# Patient Record
Sex: FEMALE | Race: WHITE | NOT HISPANIC OR LATINO | Employment: UNEMPLOYED | URBAN - METROPOLITAN AREA
[De-identification: names, ages, dates, MRNs, and addresses within clinical notes are randomized per-mention and may not be internally consistent; named-entity substitution may affect disease eponyms.]

---

## 2018-10-24 ENCOUNTER — HOSPITAL ENCOUNTER (INPATIENT)
Facility: MEDICAL CENTER | Age: 58
LOS: 9 days | DRG: 736 | End: 2018-11-02
Attending: EMERGENCY MEDICINE | Admitting: INTERNAL MEDICINE
Payer: COMMERCIAL

## 2018-10-24 ENCOUNTER — HOSPITAL ENCOUNTER (OUTPATIENT)
Dept: RADIOLOGY | Facility: MEDICAL CENTER | Age: 58
End: 2018-10-24

## 2018-10-24 ENCOUNTER — APPOINTMENT (OUTPATIENT)
Dept: RADIOLOGY | Facility: MEDICAL CENTER | Age: 58
DRG: 736 | End: 2018-10-24
Attending: EMERGENCY MEDICINE
Payer: COMMERCIAL

## 2018-10-24 DIAGNOSIS — I82.432 ACUTE DEEP VEIN THROMBOSIS (DVT) OF POPLITEAL VEIN OF LEFT LOWER EXTREMITY (HCC): ICD-10-CM

## 2018-10-24 DIAGNOSIS — I82.401 ACUTE DEEP VEIN THROMBOSIS (DVT) OF RIGHT LOWER EXTREMITY, UNSPECIFIED VEIN (HCC): ICD-10-CM

## 2018-10-24 DIAGNOSIS — R19.00 PELVIC MASS: ICD-10-CM

## 2018-10-24 DIAGNOSIS — R19.00 PELVIC MASS IN FEMALE: ICD-10-CM

## 2018-10-24 LAB
ALBUMIN SERPL BCP-MCNC: 4.4 G/DL (ref 3.2–4.9)
ALBUMIN/GLOB SERPL: 1.2 G/DL
ALP SERPL-CCNC: 88 U/L (ref 30–99)
ALT SERPL-CCNC: 12 U/L (ref 2–50)
ANION GAP SERPL CALC-SCNC: 13 MMOL/L (ref 0–11.9)
APPEARANCE UR: CLEAR
APTT PPP: 70.7 SEC (ref 24.7–36)
AST SERPL-CCNC: 19 U/L (ref 12–45)
BASOPHILS # BLD AUTO: 0.6 % (ref 0–1.8)
BASOPHILS # BLD: 0.05 K/UL (ref 0–0.12)
BILIRUB SERPL-MCNC: 0.5 MG/DL (ref 0.1–1.5)
BILIRUB UR QL STRIP.AUTO: NEGATIVE
BUN SERPL-MCNC: 12 MG/DL (ref 8–22)
CALCIUM SERPL-MCNC: 10.3 MG/DL (ref 8.5–10.5)
CHLORIDE SERPL-SCNC: 98 MMOL/L (ref 96–112)
CO2 SERPL-SCNC: 26 MMOL/L (ref 20–33)
COLOR UR: ABNORMAL
CREAT SERPL-MCNC: 0.73 MG/DL (ref 0.5–1.4)
EOSINOPHIL # BLD AUTO: 0.28 K/UL (ref 0–0.51)
EOSINOPHIL NFR BLD: 3.2 % (ref 0–6.9)
ERYTHROCYTE [DISTWIDTH] IN BLOOD BY AUTOMATED COUNT: 41.4 FL (ref 35.9–50)
GLOBULIN SER CALC-MCNC: 3.8 G/DL (ref 1.9–3.5)
GLUCOSE SERPL-MCNC: 130 MG/DL (ref 65–99)
GLUCOSE UR STRIP.AUTO-MCNC: NEGATIVE MG/DL
HCT VFR BLD AUTO: 38.7 % (ref 37–47)
HGB BLD-MCNC: 13.7 G/DL (ref 12–16)
IMM GRANULOCYTES # BLD AUTO: 0.03 K/UL (ref 0–0.11)
IMM GRANULOCYTES NFR BLD AUTO: 0.3 % (ref 0–0.9)
INR PPP: 0.97 (ref 0.87–1.13)
KETONES UR STRIP.AUTO-MCNC: 15 MG/DL
LEUKOCYTE ESTERASE UR QL STRIP.AUTO: NEGATIVE
LIPASE SERPL-CCNC: 5 U/L (ref 11–82)
LYMPHOCYTES # BLD AUTO: 1.62 K/UL (ref 1–4.8)
LYMPHOCYTES NFR BLD: 18.7 % (ref 22–41)
MCH RBC QN AUTO: 33.3 PG (ref 27–33)
MCHC RBC AUTO-ENTMCNC: 35.4 G/DL (ref 33.6–35)
MCV RBC AUTO: 93.9 FL (ref 81.4–97.8)
MICRO URNS: ABNORMAL
MONOCYTES # BLD AUTO: 0.53 K/UL (ref 0–0.85)
MONOCYTES NFR BLD AUTO: 6.1 % (ref 0–13.4)
NEUTROPHILS # BLD AUTO: 6.15 K/UL (ref 2–7.15)
NEUTROPHILS NFR BLD: 71.1 % (ref 44–72)
NITRITE UR QL STRIP.AUTO: NEGATIVE
NRBC # BLD AUTO: 0 K/UL
NRBC BLD-RTO: 0 /100 WBC
PH UR STRIP.AUTO: >=9 [PH]
PLATELET # BLD AUTO: 215 K/UL (ref 164–446)
PMV BLD AUTO: 9 FL (ref 9–12.9)
POTASSIUM SERPL-SCNC: 4.1 MMOL/L (ref 3.6–5.5)
PROT SERPL-MCNC: 8.2 G/DL (ref 6–8.2)
PROT UR QL STRIP: NEGATIVE MG/DL
PROTHROMBIN TIME: 13 SEC (ref 12–14.6)
RBC # BLD AUTO: 4.12 M/UL (ref 4.2–5.4)
RBC UR QL AUTO: NEGATIVE
SODIUM SERPL-SCNC: 137 MMOL/L (ref 135–145)
SP GR UR STRIP.AUTO: 1.02
UROBILINOGEN UR STRIP.AUTO-MCNC: 0.2 MG/DL
WBC # BLD AUTO: 8.7 K/UL (ref 4.8–10.8)

## 2018-10-24 PROCEDURE — 85730 THROMBOPLASTIN TIME PARTIAL: CPT

## 2018-10-24 PROCEDURE — 81003 URINALYSIS AUTO W/O SCOPE: CPT

## 2018-10-24 PROCEDURE — 93971 EXTREMITY STUDY: CPT | Mod: LT

## 2018-10-24 PROCEDURE — 700111 HCHG RX REV CODE 636 W/ 250 OVERRIDE (IP): Performed by: EMERGENCY MEDICINE

## 2018-10-24 PROCEDURE — 80053 COMPREHEN METABOLIC PANEL: CPT

## 2018-10-24 PROCEDURE — 80048 BASIC METABOLIC PNL TOTAL CA: CPT

## 2018-10-24 PROCEDURE — 96376 TX/PRO/DX INJ SAME DRUG ADON: CPT

## 2018-10-24 PROCEDURE — 700102 HCHG RX REV CODE 250 W/ 637 OVERRIDE(OP): Performed by: INTERNAL MEDICINE

## 2018-10-24 PROCEDURE — 83690 ASSAY OF LIPASE: CPT

## 2018-10-24 PROCEDURE — 96375 TX/PRO/DX INJ NEW DRUG ADDON: CPT

## 2018-10-24 PROCEDURE — 700102 HCHG RX REV CODE 250 W/ 637 OVERRIDE(OP): Performed by: SPECIALIST

## 2018-10-24 PROCEDURE — A9270 NON-COVERED ITEM OR SERVICE: HCPCS | Performed by: INTERNAL MEDICINE

## 2018-10-24 PROCEDURE — 96366 THER/PROPH/DIAG IV INF ADDON: CPT

## 2018-10-24 PROCEDURE — 76830 TRANSVAGINAL US NON-OB: CPT

## 2018-10-24 PROCEDURE — 96365 THER/PROPH/DIAG IV INF INIT: CPT

## 2018-10-24 PROCEDURE — 770006 HCHG ROOM/CARE - MED/SURG/GYN SEMI*

## 2018-10-24 PROCEDURE — 99285 EMERGENCY DEPT VISIT HI MDM: CPT

## 2018-10-24 PROCEDURE — 36415 COLL VENOUS BLD VENIPUNCTURE: CPT

## 2018-10-24 PROCEDURE — 85610 PROTHROMBIN TIME: CPT

## 2018-10-24 PROCEDURE — 94760 N-INVAS EAR/PLS OXIMETRY 1: CPT

## 2018-10-24 PROCEDURE — 99223 1ST HOSP IP/OBS HIGH 75: CPT | Mod: AI | Performed by: INTERNAL MEDICINE

## 2018-10-24 PROCEDURE — 304561 HCHG STAT O2

## 2018-10-24 PROCEDURE — 85025 COMPLETE CBC W/AUTO DIFF WBC: CPT | Mod: 91

## 2018-10-24 PROCEDURE — A9270 NON-COVERED ITEM OR SERVICE: HCPCS | Performed by: SPECIALIST

## 2018-10-24 RX ORDER — HEPARIN SODIUM 1000 [USP'U]/ML
2200 INJECTION, SOLUTION INTRAVENOUS; SUBCUTANEOUS PRN
Status: DISCONTINUED | OUTPATIENT
Start: 2018-10-24 | End: 2018-10-24

## 2018-10-24 RX ORDER — ACETAMINOPHEN 650 MG/1
650 SUPPOSITORY RECTAL EVERY 6 HOURS
Status: DISCONTINUED | OUTPATIENT
Start: 2018-10-24 | End: 2018-10-25

## 2018-10-24 RX ORDER — OXYCODONE HYDROCHLORIDE 5 MG/1
5 TABLET ORAL
Status: DISCONTINUED | OUTPATIENT
Start: 2018-10-24 | End: 2018-10-30

## 2018-10-24 RX ORDER — ONDANSETRON 2 MG/ML
4 INJECTION INTRAMUSCULAR; INTRAVENOUS ONCE
Status: COMPLETED | OUTPATIENT
Start: 2018-10-24 | End: 2018-10-24

## 2018-10-24 RX ORDER — TRAMADOL HYDROCHLORIDE 50 MG/1
50 TABLET ORAL EVERY 4 HOURS PRN
Status: ON HOLD | COMMUNITY
End: 2018-11-02

## 2018-10-24 RX ORDER — PROMETHAZINE HYDROCHLORIDE 12.5 MG/1
12.5-25 SUPPOSITORY RECTAL EVERY 4 HOURS PRN
Status: DISCONTINUED | OUTPATIENT
Start: 2018-10-24 | End: 2018-11-02 | Stop reason: HOSPADM

## 2018-10-24 RX ORDER — OXYCODONE HYDROCHLORIDE 5 MG/1
2.5 TABLET ORAL
Status: DISCONTINUED | OUTPATIENT
Start: 2018-10-24 | End: 2018-10-30

## 2018-10-24 RX ORDER — LORAZEPAM 2 MG/ML
1 INJECTION INTRAMUSCULAR ONCE
Status: COMPLETED | OUTPATIENT
Start: 2018-10-24 | End: 2018-10-24

## 2018-10-24 RX ORDER — PROMETHAZINE HYDROCHLORIDE 25 MG/1
12.5-25 TABLET ORAL EVERY 4 HOURS PRN
Status: DISCONTINUED | OUTPATIENT
Start: 2018-10-24 | End: 2018-11-02 | Stop reason: HOSPADM

## 2018-10-24 RX ORDER — HEPARIN SODIUM 1000 [USP'U]/ML
2200 INJECTION, SOLUTION INTRAVENOUS; SUBCUTANEOUS PRN
Status: DISCONTINUED | OUTPATIENT
Start: 2018-10-24 | End: 2018-10-30

## 2018-10-24 RX ORDER — AMOXICILLIN 250 MG
2 CAPSULE ORAL 2 TIMES DAILY
Status: DISCONTINUED | OUTPATIENT
Start: 2018-10-24 | End: 2018-10-30

## 2018-10-24 RX ORDER — ACETAMINOPHEN 325 MG/1
650 TABLET ORAL EVERY 6 HOURS PRN
COMMUNITY

## 2018-10-24 RX ORDER — MORPHINE SULFATE 4 MG/ML
1-4 INJECTION, SOLUTION INTRAMUSCULAR; INTRAVENOUS
Status: DISCONTINUED | OUTPATIENT
Start: 2018-10-24 | End: 2018-10-25

## 2018-10-24 RX ORDER — HYDROMORPHONE HYDROCHLORIDE 2 MG/ML
.5-1 INJECTION, SOLUTION INTRAMUSCULAR; INTRAVENOUS; SUBCUTANEOUS
Status: COMPLETED | OUTPATIENT
Start: 2018-10-24 | End: 2018-10-24

## 2018-10-24 RX ORDER — POLYETHYLENE GLYCOL 3350 17 G/17G
1 POWDER, FOR SOLUTION ORAL
Status: DISCONTINUED | OUTPATIENT
Start: 2018-10-24 | End: 2018-10-29

## 2018-10-24 RX ORDER — LORATADINE 10 MG/1
10 TABLET ORAL DAILY
COMMUNITY

## 2018-10-24 RX ORDER — ONDANSETRON 2 MG/ML
4 INJECTION INTRAMUSCULAR; INTRAVENOUS EVERY 4 HOURS PRN
Status: DISCONTINUED | OUTPATIENT
Start: 2018-10-24 | End: 2018-10-30

## 2018-10-24 RX ORDER — ONDANSETRON 4 MG/1
4 TABLET, ORALLY DISINTEGRATING ORAL EVERY 4 HOURS PRN
Status: DISCONTINUED | OUTPATIENT
Start: 2018-10-24 | End: 2018-11-02 | Stop reason: HOSPADM

## 2018-10-24 RX ORDER — BISACODYL 10 MG
10 SUPPOSITORY, RECTAL RECTAL
Status: DISCONTINUED | OUTPATIENT
Start: 2018-10-24 | End: 2018-10-29

## 2018-10-24 RX ORDER — HEPARIN SODIUM 1000 [USP'U]/ML
4000 INJECTION, SOLUTION INTRAVENOUS; SUBCUTANEOUS ONCE
Status: COMPLETED | OUTPATIENT
Start: 2018-10-24 | End: 2018-10-24

## 2018-10-24 RX ADMIN — SENNOSIDES AND DOCUSATE SODIUM 2 TABLET: 8.6; 5 TABLET ORAL at 21:31

## 2018-10-24 RX ADMIN — HYDROMORPHONE HYDROCHLORIDE 1 MG: 2 INJECTION INTRAMUSCULAR; INTRAVENOUS; SUBCUTANEOUS at 13:58

## 2018-10-24 RX ADMIN — BENZOCAINE AND MENTHOL 2 LOZENGE: 15; 3.6 LOZENGE ORAL at 23:17

## 2018-10-24 RX ADMIN — LORAZEPAM 1 MG: 2 INJECTION INTRAMUSCULAR; INTRAVENOUS at 15:11

## 2018-10-24 RX ADMIN — HEPARIN SODIUM 4000 UNITS: 1000 INJECTION, SOLUTION INTRAVENOUS; SUBCUTANEOUS at 17:43

## 2018-10-24 RX ADMIN — ONDANSETRON 4 MG: 2 INJECTION INTRAMUSCULAR; INTRAVENOUS at 13:58

## 2018-10-24 RX ADMIN — OXYCODONE HYDROCHLORIDE 5 MG: 5 TABLET ORAL at 21:31

## 2018-10-24 RX ADMIN — HYDROMORPHONE HYDROCHLORIDE 1 MG: 2 INJECTION INTRAMUSCULAR; INTRAVENOUS; SUBCUTANEOUS at 15:11

## 2018-10-24 RX ADMIN — HYDROMORPHONE HYDROCHLORIDE 1 MG: 2 INJECTION INTRAMUSCULAR; INTRAVENOUS; SUBCUTANEOUS at 19:51

## 2018-10-24 RX ADMIN — HEPARIN SODIUM 900 UNITS/HR: 5000 INJECTION, SOLUTION INTRAVENOUS at 17:43

## 2018-10-24 ASSESSMENT — COGNITIVE AND FUNCTIONAL STATUS - GENERAL
DAILY ACTIVITIY SCORE: 24
SUGGESTED CMS G CODE MODIFIER DAILY ACTIVITY: CH
MOBILITY SCORE: 24
SUGGESTED CMS G CODE MODIFIER MOBILITY: CH

## 2018-10-24 ASSESSMENT — PATIENT HEALTH QUESTIONNAIRE - PHQ9
SUM OF ALL RESPONSES TO PHQ9 QUESTIONS 1 AND 2: 0
2. FEELING DOWN, DEPRESSED, IRRITABLE, OR HOPELESS: NOT AT ALL
1. LITTLE INTEREST OR PLEASURE IN DOING THINGS: NOT AT ALL

## 2018-10-24 ASSESSMENT — LIFESTYLE VARIABLES
DO YOU DRINK ALCOHOL: NO
EVER_SMOKED: NEVER

## 2018-10-24 ASSESSMENT — PAIN SCALES - GENERAL
PAINLEVEL_OUTOF10: 10
PAINLEVEL_OUTOF10: 3
PAINLEVEL_OUTOF10: 7

## 2018-10-24 ASSESSMENT — COPD QUESTIONNAIRES
DURING THE PAST 4 WEEKS HOW MUCH DID YOU FEEL SHORT OF BREATH: NONE/LITTLE OF THE TIME
HAVE YOU SMOKED AT LEAST 100 CIGARETTES IN YOUR ENTIRE LIFE: NO/DON'T KNOW
COPD SCREENING SCORE: 1
DO YOU EVER COUGH UP ANY MUCUS OR PHLEGM?: NO/ONLY WITH OCCASIONAL COLDS OR INFECTIONS

## 2018-10-24 NOTE — ED PROVIDER NOTES
ED Provider Note    Scribed for Gt Reed M.D. by Amadeo Chu. 10/24/2018  1:54 PM    Primary care provider: Tawanna Alves M.D.  Means of arrival: Walk-in  History obtained from: Patient  History limited by: None    CHIEF COMPLAINT  Chief Complaint   Patient presents with   • Abdominal Pain     LEFT SIDED ABD PAIN HST OF RECENT OVARIAN CYST INCREASED PAIN TODAY        HPI  Ani Parham is a 58 y.o. Female with a history of chronic back pain who presents to the Emergency Department complaining of left sided lower abdominal pain and left sided lower back pain. The abdominal pain began in 2018. Patient has had a normal pelvic exam since then. She has been taking 3 mg tramadol. This provided good contol until today. The pain radiates down her left leg. At 8:30 AM this morning she began to experience left calf pain. Her pain is cramping, but intermittently becomes sharp. The pain is constant. She denies vomiting, constipation, diarrhea, vaginal discharge, hematuria, vaginal discharge, or blood in stool. Her back pain also returned today after her L5-S1 epidural injection two weeks ago. He had an MRI after the epidural. This demonstrated a left 7.7  cm ovarian cyst. She is status .    REVIEW OF SYSTEMS  Pertinent positives include abdominal pain, back pain, leg pain, left calf pain. Pertinent negatives include no vomiting, constipation, diarrhea, vaginal discharge, hematuria, vaginal discharge, or blood in stool.  All other systems reviewed and negative.    PAST MEDICAL HISTORY   has a past medical history of Hypothyroidism.    SURGICAL HISTORY   has a past surgical history that includes primary c section and sinuscopy.    SOCIAL HISTORY  Social History   Substance Use Topics   • Smoking status: Never Smoker   • Smokeless tobacco: Never Used   • Alcohol use No      History   Drug Use No       FAMILY HISTORY  Family History   Problem Relation Age of Onset   • Hypertension Father    •  "Hyperlipidemia Father    • Blood Disease Father         pernicious anemia   • Psychiatry Mother         bipolar   • Stroke Maternal Grandfather    • Cancer Neg Hx    • Diabetes Neg Hx    • Heart Disease Neg Hx    • Thyroid Neg Hx        CURRENT MEDICATIONS  Home Medications     Reviewed by Tony Bernal (Pharmacy Tech) on 10/24/18 at 1711  Med List Status: Complete   Medication Last Dose Status   acetaminophen (TYLENOL) 325 MG Tab 10/24/2018 Active   cyanocobalamin (VITAMIN B-12) 1000 MCG/ML Solution 10/22/2018 Active   FIBER PO 10/24/2018 Active   levothyroxine (SYNTHROID) 25 MCG Tab 10/23/2018 Active   loratadine (CLARITIN) 10 MG Tab 10/24/2018 Active   multivitamin (THERAGRAN) Tab 10/24/2018 Active   tramadol (ULTRAM) 50 MG Tab 10/24/2018 Active                ALLERGIES  Allergies   Allergen Reactions   • Fluconazole Swelling     Face Swelling       PHYSICAL EXAM  VITAL SIGNS: /66   Pulse 77   Temp 36.8 °C (98.3 °F) (Temporal)   Resp 20   Ht 1.651 m (5' 5\")   Wt 59.9 kg (132 lb 0.9 oz)   SpO2 99%   BMI 21.98 kg/m²     Constitutional: Well developed, Well nourished, Moderate to severe distress, Non-toxic appearance.   HENT: Normocephalic, Atraumatic, Bilateral external ears normal, Dry mucous membranes, No oral exudates.   Eyes: PERRLA, EOMI, Conjunctiva normal, No discharge.   Neck: No tenderness, Supple, No stridor.   Lymphatic: No lymphadenopathy noted.   Cardiovascular: Normal heart rate, Normal rhythm.   Thorax & Lungs: Clear to auscultation bilaterally, No respiratory distress, No wheezing, No crackles.   Abdomen: Soft, Moderate to severe tenderness in the left superpubic area, No masses, No pulsatile masses.   Skin: Warm, Dry, No erythema, No rash.   Extremities:, Left calf is tender to palpation with trace pitting edema, 2+ pulses No cyanosis.   Musculoskeletal: No major deformities noted.  Intact distal pulses  Neurologic: Awake, alert. Moves all extremities spontaneously.  Psychiatric: " Affect normal, Judgment normal, Mood normal.       LABS  Labs Reviewed   CBC WITH DIFFERENTIAL - Abnormal; Notable for the following:        Result Value    RBC 4.12 (*)     MCH 33.3 (*)     MCHC 35.4 (*)     Lymphocytes 18.70 (*)     All other components within normal limits   COMP METABOLIC PANEL - Abnormal; Notable for the following:     Anion Gap 13.0 (*)     Glucose 130 (*)     Globulin 3.8 (*)     All other components within normal limits   LIPASE - Abnormal; Notable for the following:     Lipase 5 (*)     All other components within normal limits   URINALYSIS,CULTURE IF INDICATED   ESTIMATED GFR   APTT   PROTHROMBIN TIME     All labs reviewed by me.      RADIOLOGY  US-PELVIC TRANSVAGINAL ONLY   Final Result      1.  Large left adnexal mass is suspicious for neoplasm. Coexisting ovarian torsion cannot be excluded in the appropriate clinical setting. MRI of the pelvis using ovarian mass protocol may be helpful for further evaluation, if clinically indicated.      2.  Nonvisualization of the right ovary      3.  Small amount of free pelvic fluid.         Comment: Results discussed with Dr. Reed at 4:10 PM      OUTSIDE IMAGES-MR PELVIS   Final Result      US-EXTREMITY VENOUS LOWER UNILAT LEFT   Final Result        The radiologist's interpretation of all radiological studies have been reviewed by me.      COURSE & MEDICAL DECISION MAKING  Pertinent Labs & Imaging studies reviewed. (See chart for details)    I reviewed the patient's medical records which showed the patient's MRI demonstrated a 7.7 cm left ovarian cyst.    1:54 PM - Patient seen and examined at bedside. Patient will be treated with lorazepam injection 1 mg, ondansetron injection 1 mg, and hydromorphone injection 0.5-1 mg. Ordered US pelvic transvaginal, US extremity venous lower unilateral left, estimated GFR, CBC with differential, CMP, Lipase, and U/A culture if indicated to evaluate her symptoms. The differential diagnoses include but are not  limited to: ruptured ovarian cyst, DVT, chronic back pain    4:08 PM I discussed the patient's case and the above findings with Radiology who believes there is a ovarian mass in the pelvis. Paged Dr. Martinez, Gynecologic Oncology.    4:13 PM Recheck: Patient re-evaluated at beside. Patient reports feeling better. Patient's radiology results and treatment plan discussed. The patient understood and is in agreement.     Decision Making:  Patient coming in with worsening lower abdominal pain, apparently patient has been having both lower abdominal pain for extended period of time, had a MRI approximately week ago that showed a cystic structure of 7.7 cm in the pelvis.  The patient's pain slowly improved until this morning when the pain got acutely worse, the patient is writhing in pain, give the patient multiple doses of pain medicine and also with anxiety medicine.  Which improved the patient's symptoms.  Ultrasound shows a complex 11 x 11 x 7 cm heterogeneous mass in the pelvis, with small amount of free fluid, ultrasound of lower extremity shows positive for DVT.  Started the patient on heparin.  Discussed the case with Dr. Mata who will consult, start the patient on a heparin drip, discussed the case with the hospitalist for admission the hospital.        DISPOSITION:  Patient will be admitted to the hospitalist in guarded condition.    FINAL IMPRESSION  1. Pelvic mass in female    2. Acute deep vein thrombosis (DVT) of popliteal vein of left lower extremity (HCC)    3.  Critical care time of 35 minutes     Amadeo DUTTON (Aixa), am scribing for, and in the presence of, Gt Reed M.D..    Electronically signed by: Amadeo Chu (A[employer]a), 10/24/2018    Gt DUTTON M.D. personally performed the services described in this documentation, as scribed by Amadeo Chu in my presence, and it is both accurate and complete. C    The note accurately reflects work and decisions made by me.   Gt Reed  10/24/2018  5:56 PM

## 2018-10-24 NOTE — ED TRIAGE NOTES
PT TO TRIAGE .  Chief Complaint   Patient presents with   • Abdominal Pain     LEFT SIDED ABD PAIN HST OF RECENT OVARIAN CYST INCREASED PAIN TODAY    TEARFUL

## 2018-10-24 NOTE — ED NOTES
Pt reports pain x 5 days, prescribed tramadol by PCP with no relief, this morning pain got acutely worse. PIV established, connected to monitor

## 2018-10-25 ENCOUNTER — APPOINTMENT (OUTPATIENT)
Dept: RADIOLOGY | Facility: MEDICAL CENTER | Age: 58
DRG: 736 | End: 2018-10-25
Attending: INTERNAL MEDICINE
Payer: COMMERCIAL

## 2018-10-25 PROBLEM — I82.409 DVT (DEEP VENOUS THROMBOSIS) (HCC): Status: ACTIVE | Noted: 2018-10-25

## 2018-10-25 PROBLEM — J96.00 ACUTE RESPIRATORY FAILURE (HCC): Status: ACTIVE | Noted: 2018-10-25

## 2018-10-25 PROBLEM — R19.00 PELVIC MASS: Status: ACTIVE | Noted: 2018-10-25

## 2018-10-25 LAB
ANION GAP SERPL CALC-SCNC: 9 MMOL/L (ref 0–11.9)
APTT PPP: 48.4 SEC (ref 24.7–36)
APTT PPP: 53.6 SEC (ref 24.7–36)
APTT PPP: 74.1 SEC (ref 24.7–36)
APTT PPP: 83.5 SEC (ref 24.7–36)
BASOPHILS # BLD AUTO: 0.7 % (ref 0–1.8)
BASOPHILS # BLD: 0.06 K/UL (ref 0–0.12)
BUN SERPL-MCNC: 8 MG/DL (ref 8–22)
CALCIUM SERPL-MCNC: 9.6 MG/DL (ref 8.5–10.5)
CHLORIDE SERPL-SCNC: 99 MMOL/L (ref 96–112)
CO2 SERPL-SCNC: 26 MMOL/L (ref 20–33)
CREAT SERPL-MCNC: 0.61 MG/DL (ref 0.5–1.4)
EOSINOPHIL # BLD AUTO: 0.18 K/UL (ref 0–0.51)
EOSINOPHIL NFR BLD: 2.1 % (ref 0–6.9)
ERYTHROCYTE [DISTWIDTH] IN BLOOD BY AUTOMATED COUNT: 41.5 FL (ref 35.9–50)
GLUCOSE SERPL-MCNC: 142 MG/DL (ref 65–99)
HCG UR QL: NEGATIVE
HCT VFR BLD AUTO: 35 % (ref 37–47)
HGB BLD-MCNC: 11.9 G/DL (ref 12–16)
IMM GRANULOCYTES # BLD AUTO: 0.1 K/UL (ref 0–0.11)
IMM GRANULOCYTES NFR BLD AUTO: 1.2 % (ref 0–0.9)
LYMPHOCYTES # BLD AUTO: 2.28 K/UL (ref 1–4.8)
LYMPHOCYTES NFR BLD: 27.1 % (ref 22–41)
MCH RBC QN AUTO: 32.1 PG (ref 27–33)
MCHC RBC AUTO-ENTMCNC: 34 G/DL (ref 33.6–35)
MCV RBC AUTO: 94.3 FL (ref 81.4–97.8)
MONOCYTES # BLD AUTO: 0.68 K/UL (ref 0–0.85)
MONOCYTES NFR BLD AUTO: 8.1 % (ref 0–13.4)
NEUTROPHILS # BLD AUTO: 5.11 K/UL (ref 2–7.15)
NEUTROPHILS NFR BLD: 60.8 % (ref 44–72)
NRBC # BLD AUTO: 0 K/UL
NRBC BLD-RTO: 0 /100 WBC
PLATELET # BLD AUTO: 186 K/UL (ref 164–446)
PMV BLD AUTO: 9.1 FL (ref 9–12.9)
POTASSIUM SERPL-SCNC: 3.9 MMOL/L (ref 3.6–5.5)
RBC # BLD AUTO: 3.71 M/UL (ref 4.2–5.4)
SODIUM SERPL-SCNC: 134 MMOL/L (ref 135–145)
SP GR UR REFRACTOMETRY: 1.01
WBC # BLD AUTO: 8.4 K/UL (ref 4.8–10.8)

## 2018-10-25 PROCEDURE — 36415 COLL VENOUS BLD VENIPUNCTURE: CPT

## 2018-10-25 PROCEDURE — 71275 CT ANGIOGRAPHY CHEST: CPT

## 2018-10-25 PROCEDURE — 700111 HCHG RX REV CODE 636 W/ 250 OVERRIDE (IP): Performed by: FAMILY MEDICINE

## 2018-10-25 PROCEDURE — 85730 THROMBOPLASTIN TIME PARTIAL: CPT

## 2018-10-25 PROCEDURE — 81025 URINE PREGNANCY TEST: CPT

## 2018-10-25 PROCEDURE — 700111 HCHG RX REV CODE 636 W/ 250 OVERRIDE (IP): Performed by: SPECIALIST

## 2018-10-25 PROCEDURE — A9270 NON-COVERED ITEM OR SERVICE: HCPCS | Performed by: INTERNAL MEDICINE

## 2018-10-25 PROCEDURE — 700117 HCHG RX CONTRAST REV CODE 255: Performed by: INTERNAL MEDICINE

## 2018-10-25 PROCEDURE — 700102 HCHG RX REV CODE 250 W/ 637 OVERRIDE(OP): Performed by: INTERNAL MEDICINE

## 2018-10-25 PROCEDURE — 770006 HCHG ROOM/CARE - MED/SURG/GYN SEMI*

## 2018-10-25 PROCEDURE — 99232 SBSQ HOSP IP/OBS MODERATE 35: CPT | Performed by: FAMILY MEDICINE

## 2018-10-25 PROCEDURE — 700111 HCHG RX REV CODE 636 W/ 250 OVERRIDE (IP): Performed by: INTERNAL MEDICINE

## 2018-10-25 PROCEDURE — 71045 X-RAY EXAM CHEST 1 VIEW: CPT

## 2018-10-25 RX ORDER — HYDROMORPHONE HYDROCHLORIDE 2 MG/ML
1 INJECTION, SOLUTION INTRAMUSCULAR; INTRAVENOUS; SUBCUTANEOUS ONCE
Status: COMPLETED | OUTPATIENT
Start: 2018-10-25 | End: 2018-10-25

## 2018-10-25 RX ORDER — ACETAMINOPHEN 325 MG/1
650 TABLET ORAL EVERY 6 HOURS PRN
Status: DISCONTINUED | OUTPATIENT
Start: 2018-10-25 | End: 2018-10-30

## 2018-10-25 RX ORDER — HYDROMORPHONE HYDROCHLORIDE 2 MG/ML
0.5 INJECTION, SOLUTION INTRAMUSCULAR; INTRAVENOUS; SUBCUTANEOUS
Status: DISCONTINUED | OUTPATIENT
Start: 2018-10-25 | End: 2018-10-27

## 2018-10-25 RX ORDER — LORATADINE 10 MG/1
10 TABLET ORAL DAILY
Status: DISCONTINUED | OUTPATIENT
Start: 2018-10-25 | End: 2018-11-02 | Stop reason: HOSPADM

## 2018-10-25 RX ORDER — LEVOTHYROXINE SODIUM 0.03 MG/1
25 TABLET ORAL
Status: DISCONTINUED | OUTPATIENT
Start: 2018-10-25 | End: 2018-11-02 | Stop reason: HOSPADM

## 2018-10-25 RX ADMIN — OXYCODONE HYDROCHLORIDE 5 MG: 5 TABLET ORAL at 04:41

## 2018-10-25 RX ADMIN — HEPARIN SODIUM 2200 UNITS: 1000 INJECTION, SOLUTION INTRAVENOUS; SUBCUTANEOUS at 01:18

## 2018-10-25 RX ADMIN — LORATADINE 10 MG: 10 TABLET ORAL at 04:11

## 2018-10-25 RX ADMIN — HYDROMORPHONE HYDROCHLORIDE 0.5 MG: 2 INJECTION INTRAMUSCULAR; INTRAVENOUS; SUBCUTANEOUS at 09:03

## 2018-10-25 RX ADMIN — HYDROMORPHONE HYDROCHLORIDE 0.5 MG: 2 INJECTION INTRAMUSCULAR; INTRAVENOUS; SUBCUTANEOUS at 15:01

## 2018-10-25 RX ADMIN — OXYCODONE HYDROCHLORIDE 5 MG: 5 TABLET ORAL at 16:01

## 2018-10-25 RX ADMIN — HYDROMORPHONE HYDROCHLORIDE 1 MG: 2 INJECTION, SOLUTION INTRAMUSCULAR; INTRAVENOUS; SUBCUTANEOUS at 05:42

## 2018-10-25 RX ADMIN — OXYCODONE HYDROCHLORIDE 5 MG: 5 TABLET ORAL at 19:56

## 2018-10-25 RX ADMIN — OXYCODONE HYDROCHLORIDE 5 MG: 5 TABLET ORAL at 12:11

## 2018-10-25 RX ADMIN — ONDANSETRON 4 MG: 4 TABLET, ORALLY DISINTEGRATING ORAL at 10:27

## 2018-10-25 RX ADMIN — HEPARIN SODIUM 2200 UNITS: 1000 INJECTION, SOLUTION INTRAVENOUS; SUBCUTANEOUS at 16:07

## 2018-10-25 RX ADMIN — MORPHINE SULFATE 2 MG: 4 INJECTION INTRAVENOUS at 01:26

## 2018-10-25 RX ADMIN — HYDROMORPHONE HYDROCHLORIDE 0.5 MG: 2 INJECTION INTRAMUSCULAR; INTRAVENOUS; SUBCUTANEOUS at 18:15

## 2018-10-25 RX ADMIN — HYDROMORPHONE HYDROCHLORIDE 0.5 MG: 2 INJECTION INTRAMUSCULAR; INTRAVENOUS; SUBCUTANEOUS at 23:23

## 2018-10-25 RX ADMIN — HEPARIN SODIUM 1200 UNITS/HR: 5000 INJECTION, SOLUTION INTRAVENOUS at 18:23

## 2018-10-25 RX ADMIN — LEVOTHYROXINE SODIUM 25 MCG: 25 TABLET ORAL at 04:11

## 2018-10-25 RX ADMIN — HEPARIN SODIUM 1100 UNITS/HR: 5000 INJECTION, SOLUTION INTRAVENOUS at 01:19

## 2018-10-25 RX ADMIN — MORPHINE SULFATE 4 MG: 4 INJECTION INTRAVENOUS at 04:14

## 2018-10-25 RX ADMIN — IOHEXOL 45 ML: 350 INJECTION, SOLUTION INTRAVENOUS at 13:15

## 2018-10-25 RX ADMIN — OXYCODONE HYDROCHLORIDE 5 MG: 5 TABLET ORAL at 07:49

## 2018-10-25 RX ADMIN — ONDANSETRON 4 MG: 2 INJECTION INTRAMUSCULAR; INTRAVENOUS at 01:22

## 2018-10-25 RX ADMIN — HYDROMORPHONE HYDROCHLORIDE 0.5 MG: 2 INJECTION INTRAMUSCULAR; INTRAVENOUS; SUBCUTANEOUS at 21:01

## 2018-10-25 RX ADMIN — SENNOSIDES AND DOCUSATE SODIUM 2 TABLET: 8.6; 5 TABLET ORAL at 04:11

## 2018-10-25 ASSESSMENT — PAIN SCALES - GENERAL
PAINLEVEL_OUTOF10: 10
PAINLEVEL_OUTOF10: 10
PAINLEVEL_OUTOF10: 4
PAINLEVEL_OUTOF10: 8
PAINLEVEL_OUTOF10: 6
PAINLEVEL_OUTOF10: 6
PAINLEVEL_OUTOF10: 8
PAINLEVEL_OUTOF10: 8

## 2018-10-25 ASSESSMENT — ENCOUNTER SYMPTOMS
DIZZINESS: 0
BACK PAIN: 1
WEIGHT LOSS: 0
FEVER: 0
BRUISES/BLEEDS EASILY: 0
COUGH: 0
NAUSEA: 0
POLYDIPSIA: 0
BLOOD IN STOOL: 0
SPUTUM PRODUCTION: 0
NECK PAIN: 0
HEMOPTYSIS: 0
BLURRED VISION: 0
TREMORS: 0
MYALGIAS: 0
VOMITING: 0
CHILLS: 0
PHOTOPHOBIA: 0
BACK PAIN: 0
PALPITATIONS: 0
ABDOMINAL PAIN: 1
DEPRESSION: 0
ORTHOPNEA: 0
DOUBLE VISION: 0
WEIGHT LOSS: 1
HEARTBURN: 0
HEADACHES: 0
TINGLING: 0
CONSTIPATION: 1
DIARRHEA: 0

## 2018-10-25 ASSESSMENT — PATIENT HEALTH QUESTIONNAIRE - PHQ9
1. LITTLE INTEREST OR PLEASURE IN DOING THINGS: NOT AT ALL
SUM OF ALL RESPONSES TO PHQ9 QUESTIONS 1 AND 2: 0
2. FEELING DOWN, DEPRESSED, IRRITABLE, OR HOPELESS: NOT AT ALL

## 2018-10-25 ASSESSMENT — LIFESTYLE VARIABLES: SUBSTANCE_ABUSE: 0

## 2018-10-25 NOTE — ASSESSMENT & PLAN NOTE
- S/p ivc filter placement (10/27)  - Continue Lovenox BID  - Gyn following (10/31):  IVC filter placed and remove after chemotherapy and will treat with AC for 9 months.  - Continue Lovenox on discharge and start Warfarin

## 2018-10-25 NOTE — PROGRESS NOTES
Assumed care of patient @0700. Pt A&O x4, on 1L O2 via nc. C/o pain 7/10; will medicate. Last BM 10/24/18. Continent of bowel & bladder. Up with standby assist, steady gait. On regular diet, takes pills without difficulty. Patient on heparin drip. Fall precautions in place; bed in lowest position, treaded socks at bedside, call light within reach.  All needs met at this time.

## 2018-10-25 NOTE — PROGRESS NOTES
· 2 RN skin check complete with EMILY Camp  · Devices in place oxygen  · Skin assessed under devices, yes no issues noted  · Confirmed pressure ulcers found on: N/A.  · New potential pressure ulcers noted: N/A  All of patient skin is intact no issues noted.

## 2018-10-25 NOTE — ASSESSMENT & PLAN NOTE
- s/p  Laparoscopy...  Exploratory laparotomy with ERNST-BSO with resection of left parametrial tissue and left ureterolysis  - Now POD#4  - Ultrasound showed:1.  Large left adnexal mass is suspicious for neoplasm. Coexisting ovarian torsion cannot be excluded in the appropriate clinical setting. MRI of the pelvis using ovarian mass protocol may be helpful for further evaluation, if clinically indicated.  2.  Nonvisualization of the right ovary  3.  Small amount of free pelvic fluid.  - CT of the abd and pelvis showed:1. A 10.6 cm cystic and solid left adnexal mass, highly concerning for malignancy.  2. Small volume ascites.  3. Tiny hypodensity in the right hepatic lobe, too small to characterize, likely simple cyst.  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  - Gyn following (10/31):  Discussed path and recommend adjuvant chemotherapy Taxol/Carbo q 3 weeks X 6 not candidate [place] [facility] [place]. Discussed prognosis.

## 2018-10-25 NOTE — CARE PLAN
Problem: Pain Management  Goal: Pain level will decrease to patient's comfort goal  Outcome: PROGRESSING AS EXPECTED  Using PRN Roxicodone to help manage patients pain.     Problem: Safety  Goal: Will remain free from falls  Outcome: PROGRESSING AS EXPECTED  Bed locked in the lowest position with call light within reach.

## 2018-10-25 NOTE — PROGRESS NOTES
[facility] Hospitalist Progress Note    Date of Service: 10/25/2018    Chief Complaint  58 y.o. female admitted 10/24/2018 with dvt and pelvic mass      Interval Problem Update  none    Consultants/Specialty  surgery    Disposition  pending        Review of Systems   Constitutional: Positive for weight loss. Negative for chills and fever.   HENT: Negative for ear discharge, ear pain and tinnitus.    Eyes: Negative for blurred vision, double vision and photophobia.   Respiratory: Negative for cough, hemoptysis and sputum production.    Cardiovascular: Negative for chest pain, palpitations and orthopnea.   Gastrointestinal: Positive for abdominal pain. Negative for heartburn and nausea.   Genitourinary: Negative for dysuria, frequency and urgency.   Musculoskeletal: Negative for back pain, myalgias and neck pain.   Skin: Negative for itching and rash.   Neurological: Negative for dizziness, tingling and headaches.      Physical Exam  Laboratory/Imaging   Hemodynamics  Temp (24hrs), Av °C (98.6 °F), Min:36.7 °C (98.1 °F), Max:37.4 °C (99.4 °F)   Temperature: 37.4 °C (99.4 °F)  Pulse  Av.8  Min: 71  Max: 93    Blood Pressure: 104/70      Respiratory      Respiration: 16, Pulse Oximetry: 95 %, O2 Daily Delivery Respiratory : Silicone Nasal Cannula        RUL Breath Sounds: Clear, RML Breath Sounds: Diminished, RLL Breath Sounds: Diminished, TIFF Breath Sounds: Clear, LLL Breath Sounds: Diminished    Fluids    Intake/Output Summary (Last 24 hours) at 10/25/18 0852  Last data filed at 10/25/18 0400   Gross per 24 hour   Intake              300 ml   Output                0 ml   Net              300 ml       Nutrition  Orders Placed This Encounter   Procedures   • Diet Order Regular     Standing Status:   Standing     Number of Occurrences:   1     Order Specific Question:   Diet:     Answer:   Regular [1]     Physical Exam   Constitutional: She is oriented to person, place, and time. No distress.   HENT:   Head:  Normocephalic and atraumatic.   Eyes: Pupils are equal, round, and reactive to light. Conjunctivae are normal.   Neck: Normal range of motion. Neck supple.   Cardiovascular: Normal rate and regular rhythm.    Pulmonary/Chest: Effort normal and breath sounds normal.   Abdominal: There is tenderness. There is no rebound and no guarding.   Musculoskeletal: She exhibits no edema or tenderness.   Neurological: She is alert and oriented to person, place, and time.   Skin: Skin is warm and dry. She is not diaphoretic.       Recent Labs      10/24/18   1235  10/24/18   2340   WBC  8.7  8.4   RBC  4.12*  3.71*   HEMOGLOBIN  13.7  11.9*   HEMATOCRIT  38.7  35.0*   MCV  93.9  94.3   MCH  33.3*  32.1   MCHC  35.4*  34.0   RDW  41.4  41.5   PLATELETCT  215  186   MPV  9.0  9.1     Recent Labs      10/24/18   1235  10/24/18   2340   SODIUM  137  134*   POTASSIUM  4.1  3.9   CHLORIDE  98  99   CO2  26  26   GLUCOSE  130*  142*   BUN  12  8   CREATININE  0.73  0.61   CALCIUM  10.3  9.6     Recent Labs      10/24/18   1235  10/24/18   2057  10/24/18   2340  10/25/18   0744   APTT   --   70.7*  53.6*  74.1*   INR  0.97   --    --    --                   Assessment/Plan     Pelvic mass   Assessment & Plan     Ultrasound showed:1.  Large left adnexal mass is suspicious for neoplasm. Coexisting ovarian torsion cannot be excluded in the appropriate clinical setting. MRI of the pelvis using ovarian mass protocol may be helpful for further evaluation, if clinically indicated.    2.  Nonvisualization of the right ovary    3.  Small amount of free pelvic fluid.  Dr. Martinez is  consulted  Pain is not well controlled  Will dc morphine  Started dilaudid  Bowel regimen        Acute respiratory failure (HCC)   Assessment & Plan    Has resolved  cxray is normal        DVT (deep venous thrombosis) (HCC)   Assessment & Plan    On heparin drip        Hypothyroidism- (present on admission)   Assessment & Plan    Continue home dose of  levothyroxine.  Follow-up with PCP          Quality-Core Measures   Gabriel catheter::  No Gabriel  DVT prophylaxis pharmacological::  Heparin

## 2018-10-25 NOTE — ASSESSMENT & PLAN NOTE
Has resolved  Pulmonary input is noted  cta showed:1.  Acute bilateral pulmonary emboli.  2.  Findings there is a possibility of RIGHT ventricular strain.  3.  Bilateral dependent atelectasis.  Cardiac echo  Showed:No prior study is available for comparison.   Left ventricular ejection fraction is visually estimated to be 60%.  No valve or doppler abnormality.  No sign of cardiac strain

## 2018-10-26 ENCOUNTER — APPOINTMENT (OUTPATIENT)
Dept: CARDIOLOGY | Facility: MEDICAL CENTER | Age: 58
DRG: 736 | End: 2018-10-26
Attending: FAMILY MEDICINE
Payer: COMMERCIAL

## 2018-10-26 ENCOUNTER — APPOINTMENT (OUTPATIENT)
Dept: RADIOLOGY | Facility: MEDICAL CENTER | Age: 58
DRG: 736 | End: 2018-10-26
Attending: SPECIALIST
Payer: COMMERCIAL

## 2018-10-26 LAB
ALBUMIN SERPL BCP-MCNC: 3.6 G/DL (ref 3.2–4.9)
ALBUMIN/GLOB SERPL: 1.1 G/DL
ALP SERPL-CCNC: 74 U/L (ref 30–99)
ALT SERPL-CCNC: 10 U/L (ref 2–50)
ANION GAP SERPL CALC-SCNC: 8 MMOL/L (ref 0–11.9)
APTT PPP: 53.8 SEC (ref 24.7–36)
APTT PPP: 76.2 SEC (ref 24.7–36)
APTT PPP: 85.5 SEC (ref 24.7–36)
AST SERPL-CCNC: 16 U/L (ref 12–45)
BASOPHILS # BLD AUTO: 1 % (ref 0–1.8)
BASOPHILS # BLD: 0.08 K/UL (ref 0–0.12)
BILIRUB SERPL-MCNC: 0.6 MG/DL (ref 0.1–1.5)
BUN SERPL-MCNC: 6 MG/DL (ref 8–22)
CALCIUM SERPL-MCNC: 9.6 MG/DL (ref 8.5–10.5)
CANCER AG125 SERPL-ACNC: 416.9 U/ML (ref 0–35)
CHLORIDE SERPL-SCNC: 98 MMOL/L (ref 96–112)
CO2 SERPL-SCNC: 28 MMOL/L (ref 20–33)
CREAT SERPL-MCNC: 0.7 MG/DL (ref 0.5–1.4)
EOSINOPHIL # BLD AUTO: 0.5 K/UL (ref 0–0.51)
EOSINOPHIL NFR BLD: 6.1 % (ref 0–6.9)
ERYTHROCYTE [DISTWIDTH] IN BLOOD BY AUTOMATED COUNT: 41.8 FL (ref 35.9–50)
GLOBULIN SER CALC-MCNC: 3.4 G/DL (ref 1.9–3.5)
GLUCOSE SERPL-MCNC: 121 MG/DL (ref 65–99)
HCT VFR BLD AUTO: 35.5 % (ref 37–47)
HGB BLD-MCNC: 11.7 G/DL (ref 12–16)
IMM GRANULOCYTES # BLD AUTO: 0.03 K/UL (ref 0–0.11)
IMM GRANULOCYTES NFR BLD AUTO: 0.4 % (ref 0–0.9)
LV EJECT FRACT  99904: 60
LV EJECT FRACT MOD 2C 99903: 75.6
LV EJECT FRACT MOD 4C 99902: 73.92
LV EJECT FRACT MOD BP 99901: 75.15
LYMPHOCYTES # BLD AUTO: 1.86 K/UL (ref 1–4.8)
LYMPHOCYTES NFR BLD: 22.6 % (ref 22–41)
MCH RBC QN AUTO: 31.5 PG (ref 27–33)
MCHC RBC AUTO-ENTMCNC: 33 G/DL (ref 33.6–35)
MCV RBC AUTO: 95.7 FL (ref 81.4–97.8)
MONOCYTES # BLD AUTO: 0.69 K/UL (ref 0–0.85)
MONOCYTES NFR BLD AUTO: 8.4 % (ref 0–13.4)
NEUTROPHILS # BLD AUTO: 5.06 K/UL (ref 2–7.15)
NEUTROPHILS NFR BLD: 61.5 % (ref 44–72)
NRBC # BLD AUTO: 0 K/UL
NRBC BLD-RTO: 0 /100 WBC
PLATELET # BLD AUTO: 191 K/UL (ref 164–446)
PMV BLD AUTO: 9.1 FL (ref 9–12.9)
POTASSIUM SERPL-SCNC: 4.3 MMOL/L (ref 3.6–5.5)
PROT SERPL-MCNC: 7 G/DL (ref 6–8.2)
RBC # BLD AUTO: 3.71 M/UL (ref 4.2–5.4)
SODIUM SERPL-SCNC: 134 MMOL/L (ref 135–145)
WBC # BLD AUTO: 8.2 K/UL (ref 4.8–10.8)

## 2018-10-26 PROCEDURE — 770006 HCHG ROOM/CARE - MED/SURG/GYN SEMI*

## 2018-10-26 PROCEDURE — 86304 IMMUNOASSAY TUMOR CA 125: CPT

## 2018-10-26 PROCEDURE — 74177 CT ABD & PELVIS W/CONTRAST: CPT

## 2018-10-26 PROCEDURE — 700102 HCHG RX REV CODE 250 W/ 637 OVERRIDE(OP): Performed by: INTERNAL MEDICINE

## 2018-10-26 PROCEDURE — 700111 HCHG RX REV CODE 636 W/ 250 OVERRIDE (IP): Performed by: FAMILY MEDICINE

## 2018-10-26 PROCEDURE — 700117 HCHG RX CONTRAST REV CODE 255: Performed by: SPECIALIST

## 2018-10-26 PROCEDURE — 93306 TTE W/DOPPLER COMPLETE: CPT

## 2018-10-26 PROCEDURE — 80053 COMPREHEN METABOLIC PANEL: CPT

## 2018-10-26 PROCEDURE — 93308 TTE F-UP OR LMTD: CPT | Mod: 26 | Performed by: INTERNAL MEDICINE

## 2018-10-26 PROCEDURE — 85730 THROMBOPLASTIN TIME PARTIAL: CPT | Mod: 91

## 2018-10-26 PROCEDURE — 700111 HCHG RX REV CODE 636 W/ 250 OVERRIDE (IP): Performed by: INTERNAL MEDICINE

## 2018-10-26 PROCEDURE — 85025 COMPLETE CBC W/AUTO DIFF WBC: CPT

## 2018-10-26 PROCEDURE — 36415 COLL VENOUS BLD VENIPUNCTURE: CPT

## 2018-10-26 PROCEDURE — A9270 NON-COVERED ITEM OR SERVICE: HCPCS | Performed by: INTERNAL MEDICINE

## 2018-10-26 PROCEDURE — 99232 SBSQ HOSP IP/OBS MODERATE 35: CPT | Performed by: FAMILY MEDICINE

## 2018-10-26 RX ADMIN — POLYETHYLENE GLYCOL 3350 1 PACKET: 17 POWDER, FOR SOLUTION ORAL at 09:31

## 2018-10-26 RX ADMIN — HYDROMORPHONE HYDROCHLORIDE 0.5 MG: 2 INJECTION INTRAMUSCULAR; INTRAVENOUS; SUBCUTANEOUS at 18:47

## 2018-10-26 RX ADMIN — IOHEXOL 100 ML: 350 INJECTION, SOLUTION INTRAVENOUS at 18:06

## 2018-10-26 RX ADMIN — HYDROMORPHONE HYDROCHLORIDE 0.5 MG: 2 INJECTION INTRAMUSCULAR; INTRAVENOUS; SUBCUTANEOUS at 10:37

## 2018-10-26 RX ADMIN — ONDANSETRON 4 MG: 2 INJECTION INTRAMUSCULAR; INTRAVENOUS at 09:37

## 2018-10-26 RX ADMIN — HYDROMORPHONE HYDROCHLORIDE 0.5 MG: 2 INJECTION INTRAMUSCULAR; INTRAVENOUS; SUBCUTANEOUS at 05:00

## 2018-10-26 RX ADMIN — OXYCODONE HYDROCHLORIDE 5 MG: 5 TABLET ORAL at 02:32

## 2018-10-26 RX ADMIN — LEVOTHYROXINE SODIUM 25 MCG: 25 TABLET ORAL at 05:00

## 2018-10-26 RX ADMIN — MAGNESIUM HYDROXIDE 30 ML: 400 SUSPENSION ORAL at 11:47

## 2018-10-26 RX ADMIN — OXYCODONE HYDROCHLORIDE 5 MG: 5 TABLET ORAL at 08:38

## 2018-10-26 RX ADMIN — HYDROMORPHONE HYDROCHLORIDE 0.5 MG: 2 INJECTION INTRAMUSCULAR; INTRAVENOUS; SUBCUTANEOUS at 23:25

## 2018-10-26 RX ADMIN — SENNOSIDES AND DOCUSATE SODIUM 2 TABLET: 8.6; 5 TABLET ORAL at 05:01

## 2018-10-26 RX ADMIN — LORATADINE 10 MG: 10 TABLET ORAL at 05:00

## 2018-10-26 RX ADMIN — OXYCODONE HYDROCHLORIDE 5 MG: 5 TABLET ORAL at 13:48

## 2018-10-26 RX ADMIN — HYDROMORPHONE HYDROCHLORIDE 0.5 MG: 2 INJECTION INTRAMUSCULAR; INTRAVENOUS; SUBCUTANEOUS at 08:13

## 2018-10-26 RX ADMIN — OXYCODONE HYDROCHLORIDE 5 MG: 5 TABLET ORAL at 20:41

## 2018-10-26 RX ADMIN — HEPARIN SODIUM 2200 UNITS: 1000 INJECTION, SOLUTION INTRAVENOUS; SUBCUTANEOUS at 13:59

## 2018-10-26 RX ADMIN — HEPARIN SODIUM 1300 UNITS/HR: 5000 INJECTION, SOLUTION INTRAVENOUS at 17:03

## 2018-10-26 RX ADMIN — HYDROMORPHONE HYDROCHLORIDE 0.5 MG: 2 INJECTION INTRAMUSCULAR; INTRAVENOUS; SUBCUTANEOUS at 14:33

## 2018-10-26 ASSESSMENT — ENCOUNTER SYMPTOMS
DIAPHORESIS: 0
EYE DISCHARGE: 0
TINGLING: 0
ABDOMINAL PAIN: 1
PHOTOPHOBIA: 0
NAUSEA: 0
FLANK PAIN: 0
CHILLS: 0
FALLS: 0
ORTHOPNEA: 0
EYE PAIN: 0
BACK PAIN: 0
TREMORS: 0
HEMOPTYSIS: 0
SHORTNESS OF BREATH: 0
VOMITING: 0
SPUTUM PRODUCTION: 0
CLAUDICATION: 0
WEIGHT LOSS: 1
SENSORY CHANGE: 0

## 2018-10-26 ASSESSMENT — PAIN SCALES - GENERAL
PAINLEVEL_OUTOF10: 6
PAINLEVEL_OUTOF10: 5
PAINLEVEL_OUTOF10: 8
PAINLEVEL_OUTOF10: 3
PAINLEVEL_OUTOF10: 5
PAINLEVEL_OUTOF10: 7
PAINLEVEL_OUTOF10: 5
PAINLEVEL_OUTOF10: 5
PAINLEVEL_OUTOF10: 9
PAINLEVEL_OUTOF10: 4

## 2018-10-26 NOTE — PROGRESS NOTES
[facility]    Date of Service: 10/26/2018    Chief Complaint  58 y.o. female admitted 10/24/2018 with dvt and pelvic mass      Interval Problem Update  IVC FILTER PLACEMENT    Consultants/Specialty  Surgery  I.R    Disposition  pending        Review of Systems   Constitutional: Positive for weight loss. Negative for chills and diaphoresis.   HENT: Negative for congestion, ear discharge and nosebleeds.    Eyes: Negative for photophobia, pain and discharge.   Respiratory: Negative for hemoptysis, sputum production and shortness of breath.    Cardiovascular: Negative for orthopnea, claudication and leg swelling.   Gastrointestinal: Positive for abdominal pain. Negative for nausea and vomiting.   Genitourinary: Negative for flank pain, frequency and hematuria.   Musculoskeletal: Negative for back pain, falls and joint pain.   Skin: Negative for itching and rash.   Neurological: Negative for tingling, tremors and sensory change.      Physical Exam  Laboratory/Imaging   Hemodynamics  Temp (24hrs), Av.9 °C (98.5 °F), Min:36.4 °C (97.6 °F), Max:37.3 °C (99.1 °F)   Temperature: 36.8 °C (98.2 °F)  Pulse  Av.1  Min: 71  Max: 93    Blood Pressure: 121/66      Respiratory      Respiration: 18, Pulse Oximetry: 99 %        RUL Breath Sounds: Clear, RML Breath Sounds: Diminished, RLL Breath Sounds: Diminished, TIFF Breath Sounds: Clear, LLL Breath Sounds: Diminished    Fluids    Intake/Output Summary (Last 24 hours) at 10/26/18 0917  Last data filed at 10/25/18 1400   Gross per 24 hour   Intake              436 ml   Output                0 ml   Net              436 ml       Nutrition  Orders Placed This Encounter   Procedures   • Diet Order Regular     Standing Status:   Standing     Number of Occurrences:   1     Order Specific Question:   Diet:     Answer:   Regular [1]     Physical Exam   Constitutional: She is oriented to person, place, and time. She appears well-developed and well-nourished.    HENT:   Right Ear: External ear normal.   Left Ear: External ear normal.   Eyes: Right eye exhibits no discharge. Left eye exhibits no discharge.   Neck: No JVD present.   Cardiovascular: Normal heart sounds.    Pulmonary/Chest: No stridor. No respiratory distress. She has no wheezes. She has no rales.   Abdominal: There is tenderness. There is guarding. There is no rebound.   Musculoskeletal: She exhibits no edema or tenderness.   Neurological: She is alert and oriented to person, place, and time.   Skin: Skin is warm and dry.       Recent Labs      10/24/18   1235  10/24/18   2340  10/26/18   0357   WBC  8.7  8.4  8.2   RBC  4.12*  3.71*  3.71*   HEMOGLOBIN  13.7  11.9*  11.7*   HEMATOCRIT  38.7  35.0*  35.5*   MCV  93.9  94.3  95.7   MCH  33.3*  32.1  31.5   MCHC  35.4*  34.0  33.0*   RDW  41.4  41.5  41.8   PLATELETCT  215  186  191   MPV  9.0  9.1  9.1     Recent Labs      10/24/18   1235  10/24/18   2340  10/26/18   0357   SODIUM  137  134*  134*   POTASSIUM  4.1  3.9  4.3   CHLORIDE  98  99  98   CO2  26  26  28   GLUCOSE  130*  142*  121*   BUN  12  8  6*   CREATININE  0.73  0.61  0.70   CALCIUM  10.3  9.6  9.6     Recent Labs      10/24/18   1235   10/25/18   1424  10/25/18   2158  10/26/18   0357   APTT   --    < >  48.4*  83.5*  85.5*   INR  0.97   --    --    --    --     < > = values in this interval not displayed.                  Assessment/Plan     Pelvic mass   Assessment & Plan     Ultrasound showed:1.  Large left adnexal mass is suspicious for neoplasm. Coexisting ovarian torsion cannot be excluded in the appropriate clinical setting. MRI of the pelvis using ovarian mass protocol may be helpful for further evaluation, if clinically indicated.    2.  Nonvisualization of the right ovary    3.  Small amount of free pelvic fluid.  Ct of the abd is pending  Surgical input is noted  She is at moderate risk for operation  Bowel regimen  D/w the pt and her  extensively yesterday and answered all  of her questions today  With the nurse in the room        Acute respiratory failure (HCC)   Assessment & Plan    Has resolved  cta showed:1.  Acute bilateral pulmonary emboli.  2.  Findings there is a possibility of RIGHT ventricular strain.  3.  Bilateral dependent atelectasis.  PT is not showing hypotension and is in no resp distress  She is on 1 liter of O2 and her O2 sat is 99%  Cardiac echo is pending        DVT (deep venous thrombosis) ([facility])   Assessment & Plan    On heparin drip        Hypothyroidism- (present on admission)   Assessment & Plan    Continue home dose of levothyroxine.  Follow-up with PCP          Quality-Core Measures   Gabriel catheter::  No Gabriel  DVT prophylaxis pharmacological::  Heparin

## 2018-10-26 NOTE — CARE PLAN
Problem: Pain Management  Goal: Pain level will decrease to patient's comfort goal  Outcome: PROGRESSING AS EXPECTED  Using PRN oxycodone and dilaudid to help manage pain.      Problem: Safety  Goal: Will remain free from injury  Outcome: PROGRESSING AS EXPECTED

## 2018-10-26 NOTE — PROGRESS NOTES
Chart reviewed and images reviewed.   Will followup with full consult.   Will need surgery however with acute PE will need IVC filter and medical clearance.   I will order  and also CT scan to evaluate the mass.

## 2018-10-27 ENCOUNTER — APPOINTMENT (OUTPATIENT)
Dept: RADIOLOGY | Facility: MEDICAL CENTER | Age: 58
DRG: 736 | End: 2018-10-27
Attending: FAMILY MEDICINE
Payer: COMMERCIAL

## 2018-10-27 LAB
APTT PPP: 69.3 SEC (ref 24.7–36)
APTT PPP: 69.9 SEC (ref 24.7–36)
INR PPP: 1.02 (ref 0.87–1.13)
PROTHROMBIN TIME: 13.5 SEC (ref 12–14.6)

## 2018-10-27 PROCEDURE — A9270 NON-COVERED ITEM OR SERVICE: HCPCS | Performed by: INTERNAL MEDICINE

## 2018-10-27 PROCEDURE — 99153 MOD SED SAME PHYS/QHP EA: CPT

## 2018-10-27 PROCEDURE — 770006 HCHG ROOM/CARE - MED/SURG/GYN SEMI*

## 2018-10-27 PROCEDURE — 85730 THROMBOPLASTIN TIME PARTIAL: CPT

## 2018-10-27 PROCEDURE — 700111 HCHG RX REV CODE 636 W/ 250 OVERRIDE (IP): Performed by: INTERNAL MEDICINE

## 2018-10-27 PROCEDURE — 700111 HCHG RX REV CODE 636 W/ 250 OVERRIDE (IP): Performed by: FAMILY MEDICINE

## 2018-10-27 PROCEDURE — 36415 COLL VENOUS BLD VENIPUNCTURE: CPT

## 2018-10-27 PROCEDURE — 06H03DZ INSERTION OF INTRALUMINAL DEVICE INTO INFERIOR VENA CAVA, PERCUTANEOUS APPROACH: ICD-10-PCS | Performed by: RADIOLOGY

## 2018-10-27 PROCEDURE — 700117 HCHG RX CONTRAST REV CODE 255: Performed by: RADIOLOGY

## 2018-10-27 PROCEDURE — 700102 HCHG RX REV CODE 250 W/ 637 OVERRIDE(OP): Performed by: INTERNAL MEDICINE

## 2018-10-27 PROCEDURE — 700111 HCHG RX REV CODE 636 W/ 250 OVERRIDE (IP)

## 2018-10-27 PROCEDURE — 99232 SBSQ HOSP IP/OBS MODERATE 35: CPT | Performed by: FAMILY MEDICINE

## 2018-10-27 PROCEDURE — 85610 PROTHROMBIN TIME: CPT

## 2018-10-27 RX ORDER — HYDROMORPHONE HYDROCHLORIDE 2 MG/ML
0.5 INJECTION, SOLUTION INTRAMUSCULAR; INTRAVENOUS; SUBCUTANEOUS
Status: DISCONTINUED | OUTPATIENT
Start: 2018-10-27 | End: 2018-10-30

## 2018-10-27 RX ORDER — ONDANSETRON 2 MG/ML
4 INJECTION INTRAMUSCULAR; INTRAVENOUS PRN
Status: ACTIVE | OUTPATIENT
Start: 2018-10-27 | End: 2018-10-27

## 2018-10-27 RX ORDER — SODIUM CHLORIDE 9 MG/ML
500 INJECTION, SOLUTION INTRAVENOUS
Status: ACTIVE | OUTPATIENT
Start: 2018-10-27 | End: 2018-10-27

## 2018-10-27 RX ORDER — MIDAZOLAM HYDROCHLORIDE 1 MG/ML
INJECTION INTRAMUSCULAR; INTRAVENOUS
Status: COMPLETED
Start: 2018-10-27 | End: 2018-10-27

## 2018-10-27 RX ORDER — MIDAZOLAM HYDROCHLORIDE 1 MG/ML
.5-2 INJECTION INTRAMUSCULAR; INTRAVENOUS PRN
Status: ACTIVE | OUTPATIENT
Start: 2018-10-27 | End: 2018-10-27

## 2018-10-27 RX ADMIN — HYDROMORPHONE HYDROCHLORIDE 0.5 MG: 2 INJECTION INTRAMUSCULAR; INTRAVENOUS; SUBCUTANEOUS at 11:10

## 2018-10-27 RX ADMIN — FENTANYL CITRATE 50 MCG: 50 INJECTION, SOLUTION INTRAMUSCULAR; INTRAVENOUS at 10:19

## 2018-10-27 RX ADMIN — OXYCODONE HYDROCHLORIDE 5 MG: 5 TABLET ORAL at 23:03

## 2018-10-27 RX ADMIN — MIDAZOLAM HYDROCHLORIDE 2 MG: 1 INJECTION INTRAMUSCULAR; INTRAVENOUS at 10:19

## 2018-10-27 RX ADMIN — HYDROMORPHONE HYDROCHLORIDE 0.5 MG: 2 INJECTION INTRAMUSCULAR; INTRAVENOUS; SUBCUTANEOUS at 18:33

## 2018-10-27 RX ADMIN — LORATADINE 10 MG: 10 TABLET ORAL at 08:14

## 2018-10-27 RX ADMIN — IOHEXOL 46 ML: 300 INJECTION, SOLUTION INTRAVENOUS at 10:40

## 2018-10-27 RX ADMIN — HYDROMORPHONE HYDROCHLORIDE 0.5 MG: 2 INJECTION INTRAMUSCULAR; INTRAVENOUS; SUBCUTANEOUS at 21:56

## 2018-10-27 RX ADMIN — HYDROMORPHONE HYDROCHLORIDE 0.5 MG: 2 INJECTION INTRAMUSCULAR; INTRAVENOUS; SUBCUTANEOUS at 08:10

## 2018-10-27 RX ADMIN — LEVOTHYROXINE SODIUM 25 MCG: 25 TABLET ORAL at 08:14

## 2018-10-27 RX ADMIN — OXYCODONE HYDROCHLORIDE 5 MG: 5 TABLET ORAL at 20:02

## 2018-10-27 RX ADMIN — OXYCODONE HYDROCHLORIDE 5 MG: 5 TABLET ORAL at 12:52

## 2018-10-27 RX ADMIN — HYDROMORPHONE HYDROCHLORIDE 0.5 MG: 2 INJECTION INTRAMUSCULAR; INTRAVENOUS; SUBCUTANEOUS at 14:27

## 2018-10-27 RX ADMIN — HYDROMORPHONE HYDROCHLORIDE 0.5 MG: 2 INJECTION INTRAMUSCULAR; INTRAVENOUS; SUBCUTANEOUS at 06:01

## 2018-10-27 RX ADMIN — SENNOSIDES AND DOCUSATE SODIUM 2 TABLET: 8.6; 5 TABLET ORAL at 18:28

## 2018-10-27 RX ADMIN — MIDAZOLAM 2 MG: 1 INJECTION INTRAMUSCULAR; INTRAVENOUS at 10:19

## 2018-10-27 RX ADMIN — HEPARIN SODIUM 1300 UNITS/HR: 5000 INJECTION, SOLUTION INTRAVENOUS at 14:33

## 2018-10-27 RX ADMIN — HYDROMORPHONE HYDROCHLORIDE 0.5 MG: 2 INJECTION INTRAMUSCULAR; INTRAVENOUS; SUBCUTANEOUS at 02:04

## 2018-10-27 RX ADMIN — OXYCODONE HYDROCHLORIDE 5 MG: 5 TABLET ORAL at 08:14

## 2018-10-27 RX ADMIN — HYDROMORPHONE HYDROCHLORIDE 0.5 MG: 2 INJECTION INTRAMUSCULAR; INTRAVENOUS; SUBCUTANEOUS at 16:15

## 2018-10-27 RX ADMIN — OXYCODONE HYDROCHLORIDE 5 MG: 5 TABLET ORAL at 02:30

## 2018-10-27 ASSESSMENT — ENCOUNTER SYMPTOMS
WEIGHT LOSS: 1
COUGH: 0
PHOTOPHOBIA: 0
DOUBLE VISION: 0
CHILLS: 0
ORTHOPNEA: 0
BLURRED VISION: 0
TREMORS: 0
HEMOPTYSIS: 0
HEADACHES: 0
VOMITING: 0
PALPITATIONS: 0
DIARRHEA: 0
BACK PAIN: 0
ABDOMINAL PAIN: 1
FEVER: 0
SPUTUM PRODUCTION: 0
FALLS: 0
TINGLING: 0

## 2018-10-27 ASSESSMENT — PAIN SCALES - GENERAL
PAINLEVEL_OUTOF10: 6
PAINLEVEL_OUTOF10: 5
PAINLEVEL_OUTOF10: 0
PAINLEVEL_OUTOF10: 7
PAINLEVEL_OUTOF10: 10
PAINLEVEL_OUTOF10: 6
PAINLEVEL_OUTOF10: 7
PAINLEVEL_OUTOF10: 5
PAINLEVEL_OUTOF10: 5
PAINLEVEL_OUTOF10: 9
PAINLEVEL_OUTOF10: 6
PAINLEVEL_OUTOF10: 7
PAINLEVEL_OUTOF10: 5
PAINLEVEL_OUTOF10: 8

## 2018-10-27 NOTE — PROGRESS NOTES
IR Procedure Note:    Site Marked and Confirmed with MD, patient and RN pre procedure. Dr. Maradiaga placed an IVC Filter.  The pt tolerated the procedure well; ETCo2 baseline 36, with consistent waveform during the procedure.  Gauze and tegaderm applied to right neck, CDI and soft; pressure held x 5 minutes.  Pt alert and verbally appropriate post procedure, vital signs stable during procedure and transport, see flow sheet for vital signs.  Report given to Tracey DIAZ.  RN transported pt to [facility].      COOK Celect Platinum IVC Filter. REF: [id number]. REF: [id number]. LOT: [id number]    Procedure End Time: 1038

## 2018-10-27 NOTE — PROGRESS NOTES
Received a phone call from IR that pt suppose to have Protime this am.  RN told her that pt has aPTT drawn at 02 and will call lab to see if PT can run from that specimen.  This RN called lab and  said to order in EPIC.

## 2018-10-27 NOTE — PROGRESS NOTES
[facility] Progress Note    Date of Service: 10/27/2018    Chief Complaint  58 y.o. female admitted 10/24/2018 with dvt and pelvic mass      Interval Problem Update  IVC FILTER PLACEMENT    Consultants/Specialty  Surgery  I.R    Disposition  pending        Review of Systems   Constitutional: Positive for weight loss. Negative for chills and fever.   HENT: Negative for ear pain, hearing loss and tinnitus.    Eyes: Negative for blurred vision, double vision and photophobia.   Respiratory: Negative for cough, hemoptysis and sputum production.    Cardiovascular: Negative for chest pain, palpitations and orthopnea.   Gastrointestinal: Positive for abdominal pain. Negative for diarrhea and vomiting.   Genitourinary: Negative for dysuria, frequency and urgency.   Musculoskeletal: Negative for back pain, falls and joint pain.   Skin: Negative for itching and rash.   Neurological: Negative for tingling, tremors and headaches.      Physical Exam  Laboratory/Imaging   Hemodynamics  Temp (24hrs), Av.7 °C (98 °F), Min:36.1 °C (96.9 °F), Max:37.2 °C (99 °F)   Temperature: 36.9 °C (98.4 °F)  Pulse  Av.8  Min: 68  Max: 93 Heart Rate (Monitored): 83  Blood Pressure: 109/59, NIBP: 119/80      Respiratory      Respiration: 18, Pulse Oximetry: 96 %        RLL Breath Sounds: Diminished, LLL Breath Sounds: Diminished    Fluids    Intake/Output Summary (Last 24 hours) at 10/27/18 1023  Last data filed at 10/26/18 1600   Gross per 24 hour   Intake             1320 ml   Output                0 ml   Net             1320 ml       Nutrition  Orders Placed This Encounter   Procedures   • Diet NPO     Standing Status:   Standing     Number of Occurrences:   8     Order Specific Question:   Restrict to:     Answer:   Sips with Medications [3]     Physical Exam   Constitutional: She is oriented to person, place, and time. No distress.   HENT:   Head: Normocephalic and atraumatic.   Eyes: Pupils are equal, round, and reactive to  light. Conjunctivae are normal.   Neck: Normal range of motion. Neck supple.   Cardiovascular: Normal rate and regular rhythm.    Pulmonary/Chest: Effort normal and breath sounds normal.   Abdominal: There is tenderness. There is no rebound and no guarding.   Musculoskeletal: She exhibits no edema or tenderness.   Neurological: She is alert and oriented to person, place, and time.   Skin: Skin is warm and dry. She is not diaphoretic.       Recent Labs      10/24/18   1235  10/24/18   2340  10/26/18   0357   WBC  8.7  8.4  8.2   RBC  4.12*  3.71*  3.71*   HEMOGLOBIN  13.7  11.9*  11.7*   HEMATOCRIT  38.7  35.0*  35.5*   MCV  93.9  94.3  95.7   MCH  33.3*  32.1  31.5   MCHC  35.4*  34.0  33.0*   RDW  41.4  41.5  41.8   PLATELETCT  215  186  191   MPV  9.0  9.1  9.1     Recent Labs      10/24/18   1235  10/24/18   2340  10/26/18   0357   SODIUM  137  134*  134*   POTASSIUM  4.1  3.9  4.3   CHLORIDE  98  99  98   CO2  26  26  28   GLUCOSE  130*  142*  121*   BUN  12  8  6*   CREATININE  0.73  0.61  0.70   CALCIUM  10.3  9.6  9.6     Recent Labs      10/24/18   1235   10/26/18   1033  10/26/18   1957  10/27/18   0202   APTT   --    < >  53.8*  76.2*  69.9*   INR  0.97   --    --    --   1.02    < > = values in this interval not displayed.                  Assessment/Plan     Pelvic mass   Assessment & Plan     Ultrasound showed:1.  Large left adnexal mass is suspicious for neoplasm. Coexisting ovarian torsion cannot be excluded in the appropriate clinical setting. MRI of the pelvis using ovarian mass protocol may be helpful for further evaluation, if clinically indicated.    2.  Nonvisualization of the right ovary    3.  Small amount of free pelvic fluid.  Ct of the abd and pelvis showed:1. A 10.6 cm cystic and solid left adnexal mass, highly concerning for malignancy.  2. Small volume ascites.  3. Tiny hypodensity in the right hepatic lobe, too small to characterize, likely simple cyst.  Surgical input is noted  She is  at moderate risk for operation  Bowel regimen  D/w the pt  extensively         Acute respiratory failure (HCC)   Assessment & Plan    Has resolved  cta showed:1.  Acute bilateral pulmonary emboli.  2.  Findings there is a possibility of RIGHT ventricular strain.  3.  Bilateral dependent atelectasis.  Cardiac echo  Showed:No prior study is available for comparison.   Left ventricular ejection fraction is visually estimated to be 60%.  No valve or doppler abnormality.  No sign of cardiac strain        DVT (deep venous thrombosis) (HCC)   Assessment & Plan    On heparin drip  ivc filter placement today        Hypothyroidism- (present on admission)   Assessment & Plan    Continue home dose of levothyroxine.  Follow-up with PCP          Quality-Core Measures   Gabriel catheter::  No Gabriel  DVT prophylaxis pharmacological::  Heparin

## 2018-10-27 NOTE — CARE PLAN
Problem: Pain Management  Goal: Pain level will decrease to patient's comfort goal  Outcome: PROGRESSING SLOWER THAN EXPECTED  Pt's pain slowly getting higher and more difficult to control, see progress note for pain management intervention    Problem: Bowel/Gastric:  Goal: Normal bowel function is maintained or improved  Outcome: PROGRESSING AS EXPECTED  Pt educated and is very aware of constipating side effects of the narcotics she's been receiving, multiple PRNs given yesterday w/ 3-4 loose BMs, no BM today, pt is passing gas. Appetite has been poor, but is aware that she should be watching out for increasing signs of constipation.

## 2018-10-27 NOTE — PROGRESS NOTES
Pt down to CT for scan of abd/pelvis w/ contrast. Oral contrast give around 1430, pt finished over two hours. Taken down to CT around 1740 w/ heparin drip still running, second PIV inserted this morning for CT to use during this time.

## 2018-10-27 NOTE — PROGRESS NOTES
Late entry:    Pt A&O x4, c/o abd pressure pain w/ some nausea as well. IV zofran and IV dilaudid given initially. Nausea relieved after dose, pain needed continual management every 2-3 hours, alternative PO and IV. Pt understands side effects of constipation w/ these narcotics. Believes some of her abd pain may be due to constipation, given PRN miralax and milk of magnesia today. Moved bowels 3 times, pt states it is very loose, but feels relieved.    Slight, trace edema in LLE w/ thready pulse, pt states some pain w/ movement, but Dr. Engle has given her permission to walk around.    Family at bedside most of the day, pt and family understand POC. Dr. Martinez talked w/ pt and family late afternoon regarding pelvic mass and surgical interventions, everyone very understanding.

## 2018-10-27 NOTE — PROGRESS NOTES
Pt down to IR w/ transport. Pre-op checklist completed. Pt has been NPO since midnight, showered herself this morning. Two 20G PIVs to right arm flushed and working well. Heparin drip stopped at 0915 after IR called to let this RN know pt would be going down for procedure soon. Consent signed and in chart.

## 2018-10-27 NOTE — OR SURGEON
Immediate Post- Operative Note        PostOp Diagnosis: PE, DVT, RH strain      Procedure(s): cavagram, ivc filter      Estimated Blood Loss: Less than 5 ml        Complications: None            10/27/2018     10:39 AM     Rafi Maradiaga

## 2018-10-27 NOTE — PROGRESS NOTES
Alternating pain med between oxy and dilaudid.  O2 sat went down to 86% after dilaudid with 1L O2.  O2 increased to 2L.  NPO since MN.  Info re: IVC filter from [facility].

## 2018-10-27 NOTE — CARE PLAN
Problem: Pain Management  Goal: Pain level will decrease to patient's comfort goal    Intervention: Follow pain managment plan developed in collaboration with patient and Interdisciplinary Team  Alternating po and iv pain med.      Problem: Bowel/Gastric:  Goal: Will not experience complications related to bowel motility    Intervention: Assess baseline bowel pattern  Last BM 10/26      Problem: Respiratory:  Goal: Respiratory status will improve    Intervention: Administer and titrate oxygen therapy  O2 1L to 2L

## 2018-10-27 NOTE — PROGRESS NOTES
IR called around 1730 regarding placement of pt's IVC filter tomorrow. IR RN told this RN pt would need to be NPO at midnight and heparin drip stopped two hours prior. Pt informed, very compliant, consent signed and in chart.

## 2018-10-27 NOTE — PROGRESS NOTES
Pt back from procedure after 1100, checked insertion site at R CORIE w/ IR RN. Dressing CDI. No limits to movement/bed rest to pt. Pt medicated for pain and given water upon return, diet re-ordered and pt hooked back up to heparin drip.

## 2018-10-27 NOTE — PROGRESS NOTES
aPTT therapeutic x 2.  Now she can have daily aPTT but she is going to IR for IVC filter.  Will have day shift to order follow up lab.

## 2018-10-27 NOTE — PROGRESS NOTES
Pt requested to have pain pill about 30 min after iv dilaudid.  Pt stated that after dilaudid, she falls asleep and when she wake up her pain level goes up to 9-10/10.  Oxy given 30 min after dilaudid.

## 2018-10-27 NOTE — PROGRESS NOTES
Pt's pain increasing to 8-9/10, worse than yesterday. States pain increases when she feels like she has to void and spikes intermittantly w/ any type of GI/ movements in her abd. Dr. Engle paged regarding possible increase in dosage, frequency increased to Qhr.

## 2018-10-28 PROBLEM — R52 PAIN: Status: ACTIVE | Noted: 2018-10-28

## 2018-10-28 LAB
APTT PPP: 52.4 SEC (ref 24.7–36)
APTT PPP: 71.6 SEC (ref 24.7–36)
BNP SERPL-MCNC: 49 PG/ML (ref 0–100)
TROPONIN I SERPL-MCNC: <0.01 NG/ML (ref 0–0.04)

## 2018-10-28 PROCEDURE — 99223 1ST HOSP IP/OBS HIGH 75: CPT | Performed by: INTERNAL MEDICINE

## 2018-10-28 PROCEDURE — 700111 HCHG RX REV CODE 636 W/ 250 OVERRIDE (IP): Performed by: INTERNAL MEDICINE

## 2018-10-28 PROCEDURE — 700102 HCHG RX REV CODE 250 W/ 637 OVERRIDE(OP): Performed by: INTERNAL MEDICINE

## 2018-10-28 PROCEDURE — 770006 HCHG ROOM/CARE - MED/SURG/GYN SEMI*

## 2018-10-28 PROCEDURE — 84484 ASSAY OF TROPONIN QUANT: CPT

## 2018-10-28 PROCEDURE — 700111 HCHG RX REV CODE 636 W/ 250 OVERRIDE (IP): Performed by: FAMILY MEDICINE

## 2018-10-28 PROCEDURE — 83880 ASSAY OF NATRIURETIC PEPTIDE: CPT

## 2018-10-28 PROCEDURE — 36415 COLL VENOUS BLD VENIPUNCTURE: CPT

## 2018-10-28 PROCEDURE — 700101 HCHG RX REV CODE 250: Performed by: SPECIALIST

## 2018-10-28 PROCEDURE — 85730 THROMBOPLASTIN TIME PARTIAL: CPT

## 2018-10-28 PROCEDURE — A9270 NON-COVERED ITEM OR SERVICE: HCPCS | Performed by: INTERNAL MEDICINE

## 2018-10-28 PROCEDURE — 99232 SBSQ HOSP IP/OBS MODERATE 35: CPT | Performed by: FAMILY MEDICINE

## 2018-10-28 RX ADMIN — SENNOSIDES AND DOCUSATE SODIUM 2 TABLET: 8.6; 5 TABLET ORAL at 16:59

## 2018-10-28 RX ADMIN — HEPARIN SODIUM 2200 UNITS: 1000 INJECTION, SOLUTION INTRAVENOUS; SUBCUTANEOUS at 17:34

## 2018-10-28 RX ADMIN — HYDROMORPHONE HYDROCHLORIDE 0.5 MG: 2 INJECTION INTRAMUSCULAR; INTRAVENOUS; SUBCUTANEOUS at 04:10

## 2018-10-28 RX ADMIN — HEPARIN SODIUM 25000 UNITS: 5000 INJECTION, SOLUTION INTRAVENOUS at 09:54

## 2018-10-28 RX ADMIN — HYDROMORPHONE HYDROCHLORIDE 0.5 MG: 2 INJECTION INTRAMUSCULAR; INTRAVENOUS; SUBCUTANEOUS at 15:24

## 2018-10-28 RX ADMIN — HYDROMORPHONE HYDROCHLORIDE 0.5 MG: 2 INJECTION INTRAMUSCULAR; INTRAVENOUS; SUBCUTANEOUS at 11:15

## 2018-10-28 RX ADMIN — SENNOSIDES AND DOCUSATE SODIUM 2 TABLET: 8.6; 5 TABLET ORAL at 05:40

## 2018-10-28 RX ADMIN — HYDROMORPHONE HYDROCHLORIDE 0.5 MG: 2 INJECTION INTRAMUSCULAR; INTRAVENOUS; SUBCUTANEOUS at 00:59

## 2018-10-28 RX ADMIN — LORATADINE 10 MG: 10 TABLET ORAL at 05:40

## 2018-10-28 RX ADMIN — POLYETHYLENE GLYCOL 3350, SODIUM SULFATE ANHYDROUS, SODIUM BICARBONATE, SODIUM CHLORIDE, POTASSIUM CHLORIDE 4 L: 236; 22.74; 6.74; 5.86; 2.97 POWDER, FOR SOLUTION ORAL at 20:52

## 2018-10-28 RX ADMIN — HYDROMORPHONE HYDROCHLORIDE 0.5 MG: 2 INJECTION INTRAMUSCULAR; INTRAVENOUS; SUBCUTANEOUS at 21:01

## 2018-10-28 RX ADMIN — OXYCODONE HYDROCHLORIDE 5 MG: 5 TABLET ORAL at 16:59

## 2018-10-28 RX ADMIN — HYDROMORPHONE HYDROCHLORIDE 0.5 MG: 2 INJECTION INTRAMUSCULAR; INTRAVENOUS; SUBCUTANEOUS at 23:52

## 2018-10-28 RX ADMIN — HYDROMORPHONE HYDROCHLORIDE 0.5 MG: 2 INJECTION INTRAMUSCULAR; INTRAVENOUS; SUBCUTANEOUS at 19:33

## 2018-10-28 RX ADMIN — LEVOTHYROXINE SODIUM 25 MCG: 25 TABLET ORAL at 05:40

## 2018-10-28 RX ADMIN — OXYCODONE HYDROCHLORIDE 5 MG: 5 TABLET ORAL at 12:30

## 2018-10-28 RX ADMIN — HYDROMORPHONE HYDROCHLORIDE 0.5 MG: 2 INJECTION INTRAMUSCULAR; INTRAVENOUS; SUBCUTANEOUS at 09:56

## 2018-10-28 RX ADMIN — OXYCODONE HYDROCHLORIDE 5 MG: 5 TABLET ORAL at 05:41

## 2018-10-28 ASSESSMENT — ENCOUNTER SYMPTOMS
ABDOMINAL DISTENTION: 0
DIZZINESS: 0
NAUSEA: 0
BACK PAIN: 0
SORE THROAT: 0
SLEEP DISTURBANCE: 0
NERVOUS/ANXIOUS: 1
DOUBLE VISION: 0
ACTIVITY CHANGE: 1
WEIGHT LOSS: 1
BRUISES/BLEEDS EASILY: 0
COUGH: 0
FALLS: 0
PHOTOPHOBIA: 0
WOUND: 0
PALPITATIONS: 0
CONSTIPATION: 0
ABDOMINAL PAIN: 1
TINGLING: 0
SHORTNESS OF BREATH: 0
DIARRHEA: 1
FATIGUE: 1
ADENOPATHY: 0
BLOOD IN STOOL: 0
SEIZURES: 0
TROUBLE SWALLOWING: 0
WEAKNESS: 1
DIARRHEA: 0
VOMITING: 0
TREMORS: 0
HEADACHES: 0
VOICE CHANGE: 0
CONFUSION: 0
APPETITE CHANGE: 1
STRIDOR: 0
COLOR CHANGE: 0
UNEXPECTED WEIGHT CHANGE: 1
CHILLS: 0
CHOKING: 0
ORTHOPNEA: 0
HEMOPTYSIS: 0
DECREASED CONCENTRATION: 0
SPUTUM PRODUCTION: 0
WHEEZING: 0
ANAL BLEEDING: 0
BLURRED VISION: 0
CHEST TIGHTNESS: 0
SPEECH DIFFICULTY: 0

## 2018-10-28 ASSESSMENT — PAIN SCALES - GENERAL
PAINLEVEL_OUTOF10: 4
PAINLEVEL_OUTOF10: 4
PAINLEVEL_OUTOF10: 6
PAINLEVEL_OUTOF10: 5
PAINLEVEL_OUTOF10: 7
PAINLEVEL_OUTOF10: 7

## 2018-10-28 NOTE — PROGRESS NOTES
Patient is AOx4, medicated frequently with dilaudid and oxycodone for pain.  Heparin running per MAR, rate verified with charge RN at start of shift.  Assessment complete, on room air.   at bedside.  Patient is up self, no bed alarm.  Bed locked in lowest position, treaded socks in place, call light within reach.  Safety educated provided.

## 2018-10-28 NOTE — PROGRESS NOTES
Pt requested to have pain med at least every 2 hours.  Pt was afraid that her pain level will go up to 9-10/10 like last night.  Pain better control tonight.  Staying from 4-7/10.  Right neck dressing CDI.

## 2018-10-28 NOTE — CONSULTS
"Pulmonary Consultation    Date of consult: 10/28/2018    Referring Physician  AMNA Alcantara.*    Reason for Consultation  Pulmonary Risk stratification for pelvic mass surgical resection tomorrow    History of Presenting Illness- obtained from pt   58 y.o. female who presented 10/24/2018 with no significant past medical history presented to our hospital after she has been having severe lower abdominal pain found to have a DVT and bilateral lobar/segmental PE.  Patient denies respiratory symptoms including cough, dyspnea, chest pain, and hemoptysis.  She never had DVT or PE in the past.  She has been having lower abdominal pain since March and CT abdomen revealed 10 cm left adnexal mass with malignant radiological features.  An IVC filter was placed 10/27/2019 in anticipation for surgery and the need to be off anticoagulation temporarily.  The patient did notice 10 pounds weight loss in the last 6 months with some appetite loss.  She is up-to-date on all cancer screening tests including mammogram, Pap and pelvic smear code last performed 2 years ago\", as well as colonoscopy at age 52 which was normal.  Patient is a lifetime non-smoker.  No DVT or PE history in the family.  Patient never had PFTs, and never received an asthma diagnosis or chronic respiratory diagnosis.  She does recall 2 acute bronchitis episodes several years ago. 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     Code Status  Full Code    Review of Systems  Review of Systems   Constitutional: Positive for activity change, appetite change, fatigue and unexpected weight change.   HENT: Negative for sore throat, tinnitus, trouble swallowing and voice change.    Respiratory: Negative for cough, choking, chest tightness, shortness of breath, wheezing and stridor.    Cardiovascular: Negative for chest pain and leg swelling.   Gastrointestinal: Positive for abdominal pain and diarrhea. Negative for abdominal distention, anal bleeding, blood " in stool, constipation, nausea and vomiting.   Skin: Negative for color change, pallor, rash and wound.   Allergic/Immunologic: Negative for environmental allergies, food allergies and immunocompromised state.   Neurological: Positive for weakness. Negative for dizziness, tremors, seizures, syncope, speech difficulty and headaches.   Hematological: Negative for adenopathy. Does not bruise/bleed easily.   Psychiatric/Behavioral: Negative for behavioral problems, confusion, decreased concentration and sleep disturbance. The patient is nervous/anxious.        Past Medical History   has a past medical history of Hypothyroidism.    Surgical History   has a past surgical history that includes primary c section and sinuscopy.    Family History  family history includes Blood Disease in her father; Hyperlipidemia in her father; Hypertension in her father; Psychiatry in her mother; Stroke in her maternal grandfather.    Social History   reports that she has never smoked. She has never used smokeless tobacco. She reports that she does not drink alcohol or use drugs.    Medications  Home Medications     Reviewed by Tony Bernal (Pharmacy Tech) on 10/24/18 at 1711  Med List Status: Complete   Medication Last Dose Status   acetaminophen (TYLENOL) 325 MG Tab 10/24/2018 Active   cyanocobalamin (VITAMIN B-12) 1000 MCG/ML Solution 10/22/2018 Active   FIBER PO 10/24/2018 Active   levothyroxine (SYNTHROID) 25 MCG Tab 10/23/2018 Active   loratadine (CLARITIN) 10 MG Tab 10/24/2018 Active   multivitamin (THERAGRAN) Tab 10/24/2018 Active   tramadol (ULTRAM) 50 MG Tab 10/24/2018 Active              Current Facility-Administered Medications   Medication Dose Route Frequency Provider Last Rate Last Dose   • Pharmacy Consult Request ...Pain Management Review 1 Each  1 Each Other PRN Rafi Maradiaga M.D.       • HYDROmorphone (DILAUDID) injection 0.5 mg  0.5 mg Intravenous Q HOUR PRN Marcela Engle M.D.   0.5 mg at 10/28/18  1115   • acetaminophen (TYLENOL) tablet 650 mg  650 mg Oral Q6HRS PRN Bj Cardoza M.D.       • levothyroxine (SYNTHROID) tablet 25 mcg  25 mcg Oral AM ES Bj Cardoza M.D.   25 mcg at 10/28/18 0540   • loratadine (CLARITIN) tablet 10 mg  10 mg Oral DAILY Bj Cardoza M.D.   10 mg at 10/28/18 0540   • senna-docusate (PERICOLACE or SENOKOT S) 8.6-50 MG per tablet 2 Tab  2 Tab Oral BID Bj Cardoza M.D.   2 Tab at 10/28/18 0540    And   • polyethylene glycol/lytes (MIRALAX) PACKET 1 Packet  1 Packet Oral QDAY PRN Bj Cardoza M.D.   1 Packet at 10/26/18 0931    And   • magnesium hydroxide (MILK OF MAGNESIA) suspension 30 mL  30 mL Oral QDAY PRN Bj Cardoza M.D.   30 mL at 10/26/18 1147    And   • bisacodyl (DULCOLAX) suppository 10 mg  10 mg Rectal QDAY PRN Bj Cardoza M.D.       • Respiratory Care per Protocol   Nebulization Continuous RT Bj Cardoza M.D.       • ondansetron (ZOFRAN) syringe/vial injection 4 mg  4 mg Intravenous Q4HRS PRN Bj Cardoza M.D.   4 mg at 10/26/18 0937   • ondansetron (ZOFRAN ODT) dispertab 4 mg  4 mg Oral Q4HRS PRN Bj Cardoza M.D.   4 mg at 10/25/18 1027   • promethazine (PHENERGAN) tablet 12.5-25 mg  12.5-25 mg Oral Q4HRS PRN Bj Cardoza M.D.       • promethazine (PHENERGAN) suppository 12.5-25 mg  12.5-25 mg Rectal Q4HRS PRN Bj Cardoza M.D.       • prochlorperazine (COMPAZINE) injection 5-10 mg  5-10 mg Intravenous Q4HRS PRN Bj Cardoza M.D.       • oxyCODONE immediate-release (ROXICODONE) tablet 2.5 mg  2.5 mg Oral Q3HRS PRN Bj Cardoza M.D.       • oxyCODONE immediate-release (ROXICODONE) tablet 5 mg  5 mg Oral Q3HRS PRN Bj Cardoza M.D.   5 mg at 10/28/18 1230   • heparin injection 2,200 Units  2,200 Units Intravenous PRN Bj Cardoza M.D.   2,200 Units at 10/26/18 1359    And   • heparin infusion 25,000 units in 500 ml 0.45% nacl   Intravenous Continuous Bj Cardoza M.D. 26 mL/hr at  10/28/18 0954 25,000 Units at 10/28/18 0954   • benzocaine-menthol (CEPACOL) lozenge 1 Lozenge  1 Lozenge Mouth/Throat Q4HRS PRN Draren Ivy M.D.           Allergies  Allergies   Allergen Reactions   • Fluconazole Swelling     Face Swelling       Vital Signs last 24 hours  Temp:  [36.3 °C (97.3 °F)-36.7 °C (98.1 °F)] 36.7 °C (98 °F)  Pulse:  [76-81] 76  Resp:  [16-18] 16  BP: (104-111)/(60-72) 108/60    Physical Exam  Physical Exam   Constitutional: She is oriented to person, place, and time. She appears well-developed and well-nourished. No distress.   Thin   HENT:   Head: Normocephalic and atraumatic.   Neck: No JVD present. No tracheal deviation present. No thyromegaly present.   Cardiovascular: Normal rate and regular rhythm.  Exam reveals no gallop and no friction rub.    No murmur heard.  Pulmonary/Chest: Effort normal and breath sounds normal. No stridor. No respiratory distress. She has no wheezes. She has no rales. She exhibits no tenderness.   Abdominal: Soft. She exhibits no distension. There is tenderness.   Musculoskeletal: She exhibits no edema, tenderness or deformity.   Lymphadenopathy:     She has no cervical adenopathy.   Neurological: She is alert and oriented to person, place, and time.   Skin: Skin is warm and dry. No rash noted. She is not diaphoretic. No erythema. No pallor.   Psychiatric: She has a normal mood and affect. Her behavior is normal. Judgment and thought content normal.   Nursing note and vitals reviewed.      Fluids    Intake/Output Summary (Last 24 hours) at 10/28/18 1457  Last data filed at 10/28/18 1347   Gross per 24 hour   Intake             2070 ml   Output                0 ml   Net             2070 ml       Laboratory  Recent Results (from the past 48 hour(s))   APTT    Collection Time: 10/26/18  7:57 PM   Result Value Ref Range    APTT 76.2 (H) 24.7 - 36.0 sec   APTT    Collection Time: 10/27/18  2:02 AM   Result Value Ref Range    APTT 69.9 (H) 24.7 - 36.0 sec    PROTHROMBIN TIME    Collection Time: 10/27/18  2:02 AM   Result Value Ref Range    PT 13.5 12.0 - 14.6 sec    INR 1.02 0.87 - 1.13   APTT    Collection Time: 10/27/18  4:45 PM   Result Value Ref Range    APTT 69.3 (H) 24.7 - 36.0 sec       Imaging  IR-INSERT IVC FILTER WITH IG & SI   Final Result      1. Ultrasound and fluoroscopic guided placement of a Cook Celect retrievable IVC filter.      2. Inferior vena cavagram within normal limits with no evidence of caval thrombus or occlusion.      CT-ABDOMEN-PELVIS WITH   Final Result         1. A 10.6 cm cystic and solid left adnexal mass, highly concerning for malignancy.   2. Small volume ascites.   3. Tiny hypodensity in the right hepatic lobe, too small to characterize, likely simple cyst.      EC-ECHOCARDIOGRAM COMPLETE W/O CONT   Final Result      CT-CTA CHEST PULMONARY ARTERY W/ RECONS   Final Result   Addendum 1 of 1   These findings were discussed with DR DREW on 10/25/2018 1:39 PM.      Final      1.  Acute bilateral pulmonary emboli.   2.  Findings there is a possibility of RIGHT ventricular strain.   3.  Bilateral dependent atelectasis.            DX-CHEST-LIMITED (1 VIEW)   Final Result      Normal chest.               INTERPRETING LOCATION: [facility], [id number]      US-PELVIC TRANSVAGINAL ONLY   Final Result      1.  Large left adnexal mass is suspicious for neoplasm. Coexisting ovarian torsion cannot be excluded in the appropriate clinical setting. MRI of the pelvis using ovarian mass protocol may be helpful for further evaluation, if clinically indicated.      2.  Nonvisualization of the right ovary      3.  Small amount of free pelvic fluid.         Comment: Results discussed with Dr. Reed at 4:10 PM      OUTSIDE IMAGES-MR PELVIS   Final Result      US-EXTREMITY VENOUS LOWER UNILAT LEFT   Final Result          Assessment/Plan  1. DVT + Bilateral PE on heparin gtt- therapeutic s/p IVC filter   Patient is moderate risk for moderate risk  "surgery  May hold heparin 6 hours prior to surgery and resume anticoagulation within 24-48 hours after surgery  Lovenox therapeutic dose 60 mg subcutaneous daily recommended over heparin whenever no further intervention is planned  Add BNP and troponin to previous labs to classify submassive PE \"won't affect risk stratification for surgery though\"     2. Left adnexal mass, suspected malignancy   For surgery tomorrow     3. Hypothyroid on levothryoxine oral       Discussed patient condition and risk of morbidity and/or mortality with Hospitalist, Family, RN and Patient.        "

## 2018-10-28 NOTE — CARE PLAN
Problem: Pain Management  Goal: Pain level will decrease to patient's comfort goal    Intervention: Follow pain managment plan developed in collaboration with patient and Interdisciplinary Team  Pt requested to have pain med ATC.  Alternating between Dilaudid and oxy      Problem: Respiratory:  Goal: Respiratory status will improve    Intervention: Administer and titrate oxygen therapy  2L O2

## 2018-10-28 NOTE — PROGRESS NOTES
[facility]    Date of Service: 10/28/2018    Chief Complaint  58 y.o. female admitted 10/24/2018 with dvt and pelvic mass      Interval Problem Update  S/p IVC FILTER PLACEMENT    Consultants/Specialty  Surgery  I.R    Disposition  pending        Review of Systems   Constitutional: Positive for weight loss. Negative for chills and malaise/fatigue.   HENT: Negative for ear pain, hearing loss and tinnitus.    Eyes: Negative for blurred vision, double vision and photophobia.   Respiratory: Negative for cough, hemoptysis and sputum production.    Cardiovascular: Negative for chest pain, palpitations and orthopnea.   Gastrointestinal: Positive for abdominal pain. Negative for diarrhea and vomiting.   Genitourinary: Negative for dysuria, frequency and urgency.   Musculoskeletal: Negative for back pain, falls and joint pain.   Skin: Negative for itching and rash.   Neurological: Negative for tingling, tremors and headaches.      Physical Exam  Laboratory/Imaging   Hemodynamics  Temp (24hrs), Av.5 °C (97.7 °F), Min:36.3 °C (97.3 °F), Max:36.7 °C (98.1 °F)   Temperature: 36.7 °C (98 °F)  Pulse  Av.9  Min: 68  Max: 93 Heart Rate (Monitored): 83  Blood Pressure: 108/60, NIBP: 118/69      Respiratory      Respiration: 16, Pulse Oximetry: 94 %        RUL Breath Sounds: Clear, RML Breath Sounds: Clear, RLL Breath Sounds: Diminished, TIFF Breath Sounds: Clear, LLL Breath Sounds: Diminished    Fluids    Intake/Output Summary (Last 24 hours) at 10/28/18 1004  Last data filed at 10/28/18 0921   Gross per 24 hour   Intake             2830 ml   Output                0 ml   Net             2830 ml       Nutrition  Orders Placed This Encounter   Procedures   • Diet Order Regular     Standing Status:   Standing     Number of Occurrences:   1     Order Specific Question:   Diet:     Answer:   Regular [1]     Physical Exam   Constitutional: She is oriented to person, place, and time. No distress.   HENT:    Right Ear: External ear normal.   Eyes: Pupils are equal, round, and reactive to light. Conjunctivae are normal.   Neck: Normal range of motion. Neck supple.   Cardiovascular: Normal rate and regular rhythm.    Pulmonary/Chest: Effort normal and breath sounds normal.   Abdominal: There is tenderness. There is no rebound and no guarding.   Musculoskeletal: She exhibits no edema or tenderness.   Neurological: She is alert and oriented to person, place, and time.   Skin: Skin is warm and dry. She is not diaphoretic.       Recent Labs      10/26/18   0357   WBC  8.2   RBC  3.71*   HEMOGLOBIN  11.7*   HEMATOCRIT  35.5*   MCV  95.7   MCH  31.5   MCHC  33.0*   RDW  41.8   PLATELETCT  191   MPV  9.1     Recent Labs      10/26/18   0357   SODIUM  134*   POTASSIUM  4.3   CHLORIDE  98   CO2  28   GLUCOSE  121*   BUN  6*   CREATININE  0.70   CALCIUM  9.6     Recent Labs      10/26/18   1957  10/27/18   0202  10/27/18   1645   APTT  76.2*  69.9*  69.3*   INR   --   1.02   --                   Assessment/Plan     Pelvic mass   Assessment & Plan     Ultrasound showed:1.  Large left adnexal mass is suspicious for neoplasm. Coexisting ovarian torsion cannot be excluded in the appropriate clinical setting. MRI of the pelvis using ovarian mass protocol may be helpful for further evaluation, if clinically indicated.    2.  Nonvisualization of the right ovary    3.  Small amount of free pelvic fluid.  Ct of the abd and pelvis showed:1. A 10.6 cm cystic and solid left adnexal mass, highly concerning for malignancy.  2. Small volume ascites.  3. Tiny hypodensity in the right hepatic lobe, too small to characterize, likely simple cyst.  Surgical input is noted  She is at moderate risk for operation  Bowel regimen  D/w the pt  extensively   Surgical input is noted  ?of O.R tomorrow        Pain   Assessment & Plan    abd pain was not well controlled  Increased dilaudid to every hour        Acute respiratory failure (HCC)   Assessment &  Plan    Has resolved  cta showed:1.  Acute bilateral pulmonary emboli.  2.  Findings there is a possibility of RIGHT ventricular strain.  3.  Bilateral dependent atelectasis.  Cardiac echo  Showed:No prior study is available for comparison.   Left ventricular ejection fraction is visually estimated to be 60%.  No valve or doppler abnormality.  No sign of cardiac strain        DVT (deep venous thrombosis) ([facility])   Assessment & Plan    On heparin drip  S/p ivc filter placement        Hypothyroidism- (present on admission)   Assessment & Plan    Continue home dose of levothyroxine.  Follow-up with PCP          Quality-Core Measures   Gabriel catheter::  No Gabriel  DVT prophylaxis pharmacological::  Heparin

## 2018-10-29 ENCOUNTER — APPOINTMENT (OUTPATIENT)
Dept: RADIOLOGY | Facility: MEDICAL CENTER | Age: 58
DRG: 736 | End: 2018-10-29
Attending: SPECIALIST
Payer: COMMERCIAL

## 2018-10-29 LAB
ABO GROUP BLD: NORMAL
ABO GROUP BLD: NORMAL
ALBUMIN SERPL BCP-MCNC: 3.5 G/DL (ref 3.2–4.9)
ALBUMIN/GLOB SERPL: 1.1 G/DL
ALP SERPL-CCNC: 66 U/L (ref 30–99)
ALT SERPL-CCNC: 8 U/L (ref 2–50)
ANION GAP SERPL CALC-SCNC: 9 MMOL/L (ref 0–11.9)
APTT PPP: 79.6 SEC (ref 24.7–36)
AST SERPL-CCNC: 12 U/L (ref 12–45)
BASOPHILS # BLD AUTO: 0.6 % (ref 0–1.8)
BASOPHILS # BLD: 0.03 K/UL (ref 0–0.12)
BILIRUB SERPL-MCNC: 0.4 MG/DL (ref 0.1–1.5)
BLD GP AB SCN SERPL QL: NORMAL
BUN SERPL-MCNC: 9 MG/DL (ref 8–22)
CALCIUM SERPL-MCNC: 9 MG/DL (ref 8.5–10.5)
CHLORIDE SERPL-SCNC: 101 MMOL/L (ref 96–112)
CO2 SERPL-SCNC: 27 MMOL/L (ref 20–33)
CREAT SERPL-MCNC: 0.52 MG/DL (ref 0.5–1.4)
EOSINOPHIL # BLD AUTO: 0.29 K/UL (ref 0–0.51)
EOSINOPHIL NFR BLD: 5.9 % (ref 0–6.9)
ERYTHROCYTE [DISTWIDTH] IN BLOOD BY AUTOMATED COUNT: 39.6 FL (ref 35.9–50)
GLOBULIN SER CALC-MCNC: 3.2 G/DL (ref 1.9–3.5)
GLUCOSE SERPL-MCNC: 114 MG/DL (ref 65–99)
HCT VFR BLD AUTO: 29.8 % (ref 37–47)
HGB BLD-MCNC: 10.3 G/DL (ref 12–16)
IMM GRANULOCYTES # BLD AUTO: 0.02 K/UL (ref 0–0.11)
IMM GRANULOCYTES NFR BLD AUTO: 0.4 % (ref 0–0.9)
LYMPHOCYTES # BLD AUTO: 1.54 K/UL (ref 1–4.8)
LYMPHOCYTES NFR BLD: 31.5 % (ref 22–41)
MCH RBC QN AUTO: 32.8 PG (ref 27–33)
MCHC RBC AUTO-ENTMCNC: 34.6 G/DL (ref 33.6–35)
MCV RBC AUTO: 94.9 FL (ref 81.4–97.8)
MONOCYTES # BLD AUTO: 0.48 K/UL (ref 0–0.85)
MONOCYTES NFR BLD AUTO: 9.8 % (ref 0–13.4)
NEUTROPHILS # BLD AUTO: 2.53 K/UL (ref 2–7.15)
NEUTROPHILS NFR BLD: 51.8 % (ref 44–72)
NRBC # BLD AUTO: 0 K/UL
NRBC BLD-RTO: 0 /100 WBC
PLATELET # BLD AUTO: 249 K/UL (ref 164–446)
PMV BLD AUTO: 9.5 FL (ref 9–12.9)
POTASSIUM SERPL-SCNC: 3.8 MMOL/L (ref 3.6–5.5)
PROT SERPL-MCNC: 6.7 G/DL (ref 6–8.2)
RBC # BLD AUTO: 3.14 M/UL (ref 4.2–5.4)
RH BLD: NORMAL
RH BLD: NORMAL
SODIUM SERPL-SCNC: 137 MMOL/L (ref 135–145)
WBC # BLD AUTO: 4.9 K/UL (ref 4.8–10.8)

## 2018-10-29 PROCEDURE — 160002 HCHG RECOVERY MINUTES (STAT): Performed by: SPECIALIST

## 2018-10-29 PROCEDURE — 0UT90ZZ RESECTION OF UTERUS, OPEN APPROACH: ICD-10-PCS | Performed by: SPECIALIST

## 2018-10-29 PROCEDURE — 700102 HCHG RX REV CODE 250 W/ 637 OVERRIDE(OP): Performed by: ANESTHESIOLOGY

## 2018-10-29 PROCEDURE — A9270 NON-COVERED ITEM OR SERVICE: HCPCS | Performed by: ANESTHESIOLOGY

## 2018-10-29 PROCEDURE — 88112 CYTOPATH CELL ENHANCE TECH: CPT

## 2018-10-29 PROCEDURE — 0DTU0ZZ RESECTION OF OMENTUM, OPEN APPROACH: ICD-10-PCS | Performed by: SPECIALIST

## 2018-10-29 PROCEDURE — 88305 TISSUE EXAM BY PATHOLOGIST: CPT

## 2018-10-29 PROCEDURE — 86901 BLOOD TYPING SEROLOGIC RH(D): CPT

## 2018-10-29 PROCEDURE — A9270 NON-COVERED ITEM OR SERVICE: HCPCS | Performed by: INTERNAL MEDICINE

## 2018-10-29 PROCEDURE — 86850 RBC ANTIBODY SCREEN: CPT

## 2018-10-29 PROCEDURE — 0TN70ZZ RELEASE LEFT URETER, OPEN APPROACH: ICD-10-PCS | Performed by: SPECIALIST

## 2018-10-29 PROCEDURE — 700101 HCHG RX REV CODE 250

## 2018-10-29 PROCEDURE — 500371 HCHG DRAIN, BLAKE 10MM: Performed by: SPECIALIST

## 2018-10-29 PROCEDURE — 80053 COMPREHEN METABOLIC PANEL: CPT

## 2018-10-29 PROCEDURE — 700111 HCHG RX REV CODE 636 W/ 250 OVERRIDE (IP)

## 2018-10-29 PROCEDURE — 0UT20ZZ RESECTION OF BILATERAL OVARIES, OPEN APPROACH: ICD-10-PCS | Performed by: SPECIALIST

## 2018-10-29 PROCEDURE — 8E0W4CZ ROBOTIC ASSISTED PROCEDURE OF TRUNK REGION, PERCUTANEOUS ENDOSCOPIC APPROACH: ICD-10-PCS | Performed by: SPECIALIST

## 2018-10-29 PROCEDURE — 88307 TISSUE EXAM BY PATHOLOGIST: CPT | Mod: 59

## 2018-10-29 PROCEDURE — 85025 COMPLETE CBC W/AUTO DIFF WBC: CPT

## 2018-10-29 PROCEDURE — 88331 PATH CONSLTJ SURG 1 BLK 1SPC: CPT

## 2018-10-29 PROCEDURE — 160035 HCHG PACU - 1ST 60 MINS PHASE I: Performed by: SPECIALIST

## 2018-10-29 PROCEDURE — 700111 HCHG RX REV CODE 636 W/ 250 OVERRIDE (IP): Performed by: FAMILY MEDICINE

## 2018-10-29 PROCEDURE — 160031 HCHG SURGERY MINUTES - 1ST 30 MINS LEVEL 5: Performed by: SPECIALIST

## 2018-10-29 PROCEDURE — 500424 HCHG DRESSING, AIRSTRIP: Performed by: SPECIALIST

## 2018-10-29 PROCEDURE — 160036 HCHG PACU - EA ADDL 30 MINS PHASE I: Performed by: SPECIALIST

## 2018-10-29 PROCEDURE — 07BD0ZX EXCISION OF AORTIC LYMPHATIC, OPEN APPROACH, DIAGNOSTIC: ICD-10-PCS | Performed by: SPECIALIST

## 2018-10-29 PROCEDURE — 160042 HCHG SURGERY MINUTES - EA ADDL 1 MIN LEVEL 5: Performed by: SPECIALIST

## 2018-10-29 PROCEDURE — A4314 CATH W/DRAINAGE 2-WAY LATEX: HCPCS | Performed by: SPECIALIST

## 2018-10-29 PROCEDURE — 0DBV0ZX EXCISION OF MESENTERY, OPEN APPROACH, DIAGNOSTIC: ICD-10-PCS | Performed by: SPECIALIST

## 2018-10-29 PROCEDURE — 160009 HCHG ANES TIME/MIN: Performed by: SPECIALIST

## 2018-10-29 PROCEDURE — 770006 HCHG ROOM/CARE - MED/SURG/GYN SEMI*

## 2018-10-29 PROCEDURE — 86900 BLOOD TYPING SEROLOGIC ABO: CPT

## 2018-10-29 PROCEDURE — 36415 COLL VENOUS BLD VENIPUNCTURE: CPT

## 2018-10-29 PROCEDURE — 700111 HCHG RX REV CODE 636 W/ 250 OVERRIDE (IP): Performed by: ANESTHESIOLOGY

## 2018-10-29 PROCEDURE — 700111 HCHG RX REV CODE 636 W/ 250 OVERRIDE (IP): Performed by: INTERNAL MEDICINE

## 2018-10-29 PROCEDURE — 501838 HCHG SUTURE GENERAL: Performed by: SPECIALIST

## 2018-10-29 PROCEDURE — 160048 HCHG OR STATISTICAL LEVEL 1-5: Performed by: SPECIALIST

## 2018-10-29 PROCEDURE — A6402 STERILE GAUZE <= 16 SQ IN: HCPCS | Performed by: SPECIALIST

## 2018-10-29 PROCEDURE — 07BC0ZX EXCISION OF PELVIS LYMPHATIC, OPEN APPROACH, DIAGNOSTIC: ICD-10-PCS | Performed by: SPECIALIST

## 2018-10-29 PROCEDURE — 99232 SBSQ HOSP IP/OBS MODERATE 35: CPT | Performed by: FAMILY MEDICINE

## 2018-10-29 PROCEDURE — 88309 TISSUE EXAM BY PATHOLOGIST: CPT

## 2018-10-29 PROCEDURE — 700111 HCHG RX REV CODE 636 W/ 250 OVERRIDE (IP): Performed by: SPECIALIST

## 2018-10-29 PROCEDURE — 74018 RADEX ABDOMEN 1 VIEW: CPT

## 2018-10-29 PROCEDURE — 500389 HCHG DRAIN, RESERVOIR SUCT JP 100CC: Performed by: SPECIALIST

## 2018-10-29 PROCEDURE — 85730 THROMBOPLASTIN TIME PARTIAL: CPT

## 2018-10-29 PROCEDURE — 0UJD4ZZ INSPECTION OF UTERUS AND CERVIX, PERCUTANEOUS ENDOSCOPIC APPROACH: ICD-10-PCS | Performed by: SPECIALIST

## 2018-10-29 PROCEDURE — 0UT70ZZ RESECTION OF BILATERAL FALLOPIAN TUBES, OPEN APPROACH: ICD-10-PCS | Performed by: SPECIALIST

## 2018-10-29 PROCEDURE — 700102 HCHG RX REV CODE 250 W/ 637 OVERRIDE(OP): Performed by: INTERNAL MEDICINE

## 2018-10-29 RX ORDER — OXYCODONE HYDROCHLORIDE AND ACETAMINOPHEN 5; 325 MG/1; MG/1
2 TABLET ORAL
Status: COMPLETED | OUTPATIENT
Start: 2018-10-29 | End: 2018-10-29

## 2018-10-29 RX ORDER — HYDROMORPHONE HYDROCHLORIDE 2 MG/ML
0.2 INJECTION, SOLUTION INTRAMUSCULAR; INTRAVENOUS; SUBCUTANEOUS
Status: DISCONTINUED | OUTPATIENT
Start: 2018-10-29 | End: 2018-10-30

## 2018-10-29 RX ORDER — SODIUM CHLORIDE AND POTASSIUM CHLORIDE 150; 450 MG/100ML; MG/100ML
INJECTION, SOLUTION INTRAVENOUS CONTINUOUS
Status: DISCONTINUED | OUTPATIENT
Start: 2018-10-29 | End: 2018-11-02 | Stop reason: HOSPADM

## 2018-10-29 RX ORDER — AMOXICILLIN 250 MG
2 CAPSULE ORAL 2 TIMES DAILY
Status: DISCONTINUED | OUTPATIENT
Start: 2018-10-30 | End: 2018-11-02 | Stop reason: HOSPADM

## 2018-10-29 RX ORDER — KETOROLAC TROMETHAMINE 30 MG/ML
INJECTION, SOLUTION INTRAMUSCULAR; INTRAVENOUS
Status: COMPLETED
Start: 2018-10-29 | End: 2018-10-29

## 2018-10-29 RX ORDER — MIDAZOLAM HYDROCHLORIDE 1 MG/ML
1 INJECTION INTRAMUSCULAR; INTRAVENOUS
Status: COMPLETED | OUTPATIENT
Start: 2018-10-29 | End: 2018-10-29

## 2018-10-29 RX ORDER — OXYCODONE HYDROCHLORIDE AND ACETAMINOPHEN 5; 325 MG/1; MG/1
1 TABLET ORAL
Status: COMPLETED | OUTPATIENT
Start: 2018-10-29 | End: 2018-10-29

## 2018-10-29 RX ORDER — POLYETHYLENE GLYCOL 3350 17 G/17G
1 POWDER, FOR SOLUTION ORAL
Status: DISCONTINUED | OUTPATIENT
Start: 2018-10-29 | End: 2018-11-02 | Stop reason: HOSPADM

## 2018-10-29 RX ORDER — MEPERIDINE HYDROCHLORIDE 25 MG/ML
12.5 INJECTION INTRAMUSCULAR; INTRAVENOUS; SUBCUTANEOUS
Status: DISCONTINUED | OUTPATIENT
Start: 2018-10-29 | End: 2018-10-30 | Stop reason: HOSPADM

## 2018-10-29 RX ORDER — MAGNESIUM HYDROXIDE 1200 MG/15ML
LIQUID ORAL
Status: DISCONTINUED | OUTPATIENT
Start: 2018-10-29 | End: 2018-10-29 | Stop reason: HOSPADM

## 2018-10-29 RX ORDER — ONDANSETRON 2 MG/ML
4 INJECTION INTRAMUSCULAR; INTRAVENOUS
Status: COMPLETED | OUTPATIENT
Start: 2018-10-29 | End: 2018-10-29

## 2018-10-29 RX ORDER — ACETAMINOPHEN 325 MG/1
650 TABLET ORAL EVERY 6 HOURS PRN
Status: DISCONTINUED | OUTPATIENT
Start: 2018-10-29 | End: 2018-11-02 | Stop reason: HOSPADM

## 2018-10-29 RX ORDER — ONDANSETRON 2 MG/ML
4 INJECTION INTRAMUSCULAR; INTRAVENOUS EVERY 6 HOURS PRN
Status: DISCONTINUED | OUTPATIENT
Start: 2018-10-29 | End: 2018-11-02 | Stop reason: HOSPADM

## 2018-10-29 RX ORDER — MIDAZOLAM HYDROCHLORIDE 1 MG/ML
INJECTION INTRAMUSCULAR; INTRAVENOUS
Status: DISPENSED
Start: 2018-10-29 | End: 2018-10-30

## 2018-10-29 RX ORDER — DIPHENHYDRAMINE HYDROCHLORIDE 50 MG/ML
12.5 INJECTION INTRAMUSCULAR; INTRAVENOUS
Status: DISCONTINUED | OUTPATIENT
Start: 2018-10-29 | End: 2018-10-30 | Stop reason: HOSPADM

## 2018-10-29 RX ORDER — KETOROLAC TROMETHAMINE 30 MG/ML
30 INJECTION, SOLUTION INTRAMUSCULAR; INTRAVENOUS EVERY 6 HOURS PRN
Status: DISCONTINUED | OUTPATIENT
Start: 2018-10-29 | End: 2018-10-30

## 2018-10-29 RX ORDER — SODIUM CHLORIDE, SODIUM LACTATE, POTASSIUM CHLORIDE, CALCIUM CHLORIDE 600; 310; 30; 20 MG/100ML; MG/100ML; MG/100ML; MG/100ML
INJECTION, SOLUTION INTRAVENOUS CONTINUOUS
Status: DISCONTINUED | OUTPATIENT
Start: 2018-10-29 | End: 2018-10-30 | Stop reason: HOSPADM

## 2018-10-29 RX ORDER — LORAZEPAM 2 MG/ML
0.5 INJECTION INTRAMUSCULAR ONCE
Status: ACTIVE | OUTPATIENT
Start: 2018-10-29 | End: 2018-10-30

## 2018-10-29 RX ORDER — MAGNESIUM HYDROXIDE 1200 MG/15ML
LIQUID ORAL
Status: COMPLETED | OUTPATIENT
Start: 2018-10-29 | End: 2018-10-29

## 2018-10-29 RX ORDER — BISACODYL 10 MG
10 SUPPOSITORY, RECTAL RECTAL
Status: DISCONTINUED | OUTPATIENT
Start: 2018-10-29 | End: 2018-11-02 | Stop reason: HOSPADM

## 2018-10-29 RX ORDER — HYDROMORPHONE HYDROCHLORIDE 2 MG/ML
0.5 INJECTION, SOLUTION INTRAMUSCULAR; INTRAVENOUS; SUBCUTANEOUS
Status: DISCONTINUED | OUTPATIENT
Start: 2018-10-29 | End: 2018-10-30 | Stop reason: HOSPADM

## 2018-10-29 RX ORDER — HALOPERIDOL 5 MG/ML
1 INJECTION INTRAMUSCULAR
Status: DISCONTINUED | OUTPATIENT
Start: 2018-10-29 | End: 2018-10-30 | Stop reason: HOSPADM

## 2018-10-29 RX ADMIN — OXYCODONE HYDROCHLORIDE 5 MG: 5 TABLET ORAL at 13:47

## 2018-10-29 RX ADMIN — FENTANYL CITRATE 50 MCG: 50 INJECTION, SOLUTION INTRAMUSCULAR; INTRAVENOUS at 21:50

## 2018-10-29 RX ADMIN — HYDROMORPHONE HYDROCHLORIDE 0.5 MG: 2 INJECTION INTRAMUSCULAR; INTRAVENOUS; SUBCUTANEOUS at 07:32

## 2018-10-29 RX ADMIN — HYDROMORPHONE HYDROCHLORIDE 0.5 MG: 1 INJECTION, SOLUTION INTRAMUSCULAR; INTRAVENOUS; SUBCUTANEOUS at 21:55

## 2018-10-29 RX ADMIN — OXYCODONE HYDROCHLORIDE 5 MG: 5 TABLET ORAL at 04:41

## 2018-10-29 RX ADMIN — MIDAZOLAM 1 MG: 1 INJECTION INTRAMUSCULAR; INTRAVENOUS at 23:15

## 2018-10-29 RX ADMIN — KETOROLAC TROMETHAMINE 30 MG: 30 INJECTION, SOLUTION INTRAMUSCULAR at 22:38

## 2018-10-29 RX ADMIN — LORATADINE 10 MG: 10 TABLET ORAL at 04:39

## 2018-10-29 RX ADMIN — HYDROMORPHONE HYDROCHLORIDE 0.5 MG: 2 INJECTION INTRAMUSCULAR; INTRAVENOUS; SUBCUTANEOUS at 09:38

## 2018-10-29 RX ADMIN — HYDROMORPHONE HYDROCHLORIDE 0.5 MG: 1 INJECTION, SOLUTION INTRAMUSCULAR; INTRAVENOUS; SUBCUTANEOUS at 23:20

## 2018-10-29 RX ADMIN — HYDROMORPHONE HYDROCHLORIDE 0.5 MG: 2 INJECTION INTRAMUSCULAR; INTRAVENOUS; SUBCUTANEOUS at 16:03

## 2018-10-29 RX ADMIN — ONDANSETRON HYDROCHLORIDE 4 MG: 2 INJECTION, SOLUTION INTRAMUSCULAR; INTRAVENOUS at 22:25

## 2018-10-29 RX ADMIN — HYDROMORPHONE HYDROCHLORIDE 0.5 MG: 1 INJECTION, SOLUTION INTRAMUSCULAR; INTRAVENOUS; SUBCUTANEOUS at 22:10

## 2018-10-29 RX ADMIN — LEVOTHYROXINE SODIUM 25 MCG: 25 TABLET ORAL at 04:39

## 2018-10-29 RX ADMIN — FENTANYL CITRATE 50 MCG: 50 INJECTION, SOLUTION INTRAMUSCULAR; INTRAVENOUS at 21:47

## 2018-10-29 RX ADMIN — MORPHINE SULFATE: 50 INJECTION, SOLUTION, CONCENTRATE INTRAVENOUS at 23:10

## 2018-10-29 RX ADMIN — HYDROMORPHONE HYDROCHLORIDE 0.5 MG: 1 INJECTION, SOLUTION INTRAMUSCULAR; INTRAVENOUS; SUBCUTANEOUS at 22:00

## 2018-10-29 RX ADMIN — HEPARIN SODIUM 1400 UNITS/HR: 5000 INJECTION, SOLUTION INTRAVENOUS at 06:23

## 2018-10-29 RX ADMIN — HYDROMORPHONE HYDROCHLORIDE 0.5 MG: 1 INJECTION, SOLUTION INTRAMUSCULAR; INTRAVENOUS; SUBCUTANEOUS at 22:30

## 2018-10-29 RX ADMIN — HYDROMORPHONE HYDROCHLORIDE 0.5 MG: 1 INJECTION, SOLUTION INTRAMUSCULAR; INTRAVENOUS; SUBCUTANEOUS at 23:00

## 2018-10-29 RX ADMIN — MIDAZOLAM 1 MG: 1 INJECTION INTRAMUSCULAR; INTRAVENOUS at 22:31

## 2018-10-29 RX ADMIN — HYDROMORPHONE HYDROCHLORIDE 0.5 MG: 1 INJECTION, SOLUTION INTRAMUSCULAR; INTRAVENOUS; SUBCUTANEOUS at 22:05

## 2018-10-29 RX ADMIN — OXYCODONE HYDROCHLORIDE 5 MG: 5 TABLET ORAL at 07:53

## 2018-10-29 RX ADMIN — HYDROMORPHONE HYDROCHLORIDE 0.5 MG: 2 INJECTION INTRAMUSCULAR; INTRAVENOUS; SUBCUTANEOUS at 11:17

## 2018-10-29 RX ADMIN — HYDROMORPHONE HYDROCHLORIDE 0.5 MG: 2 INJECTION INTRAMUSCULAR; INTRAVENOUS; SUBCUTANEOUS at 13:48

## 2018-10-29 RX ADMIN — HYDROMORPHONE HYDROCHLORIDE 0.5 MG: 1 INJECTION, SOLUTION INTRAMUSCULAR; INTRAVENOUS; SUBCUTANEOUS at 22:20

## 2018-10-29 RX ADMIN — HYDROMORPHONE HYDROCHLORIDE 0.5 MG: 2 INJECTION INTRAMUSCULAR; INTRAVENOUS; SUBCUTANEOUS at 03:36

## 2018-10-29 RX ADMIN — OXYCODONE AND ACETAMINOPHEN 2 TABLET: 5; 325 TABLET ORAL at 22:17

## 2018-10-29 ASSESSMENT — ENCOUNTER SYMPTOMS
MYALGIAS: 0
VOMITING: 0
HEARTBURN: 0
CHILLS: 0
TREMORS: 0
EYE DISCHARGE: 0
ORTHOPNEA: 0
SPUTUM PRODUCTION: 0
SENSORY CHANGE: 0
FEVER: 0
PHOTOPHOBIA: 0
WHEEZING: 0
NAUSEA: 0
WEIGHT LOSS: 1
NECK PAIN: 0
EYE PAIN: 0
BACK PAIN: 0
SHORTNESS OF BREATH: 0
FLANK PAIN: 0
ABDOMINAL PAIN: 1
CLAUDICATION: 0
SPEECH CHANGE: 0

## 2018-10-29 ASSESSMENT — PAIN SCALES - GENERAL
PAINLEVEL_OUTOF10: 6
PAINLEVEL_OUTOF10: 8
PAINLEVEL_OUTOF10: 8
PAINLEVEL_OUTOF10: 0
PAINLEVEL_OUTOF10: 8
PAINLEVEL_OUTOF10: 7
PAINLEVEL_OUTOF10: 10
PAINLEVEL_OUTOF10: 0
PAINLEVEL_OUTOF10: 0
PAINLEVEL_OUTOF10: 6
PAINLEVEL_OUTOF10: 6
PAINLEVEL_OUTOF10: 4
PAINLEVEL_OUTOF10: 7
PAINLEVEL_OUTOF10: 0
PAINLEVEL_OUTOF10: 10

## 2018-10-29 NOTE — CARE PLAN
Problem: Pain Management  Goal: Pain level will decrease to patient's comfort goal  Outcome: PROGRESSING AS EXPECTED  PRN dilaudid provided.     Problem: Safety  Goal: Will remain free from falls  Outcome: PROGRESSING AS EXPECTED  Bed is locked and in lowest position, call light and bedside table are within reach, treaded slipper socks are on, and hourly rounding is in place.

## 2018-10-29 NOTE — PROGRESS NOTES
Assumed care at 1900. Pt started on Golytely this evening in preporation for surgery that is tentatively scheduled for tomorrow.  Patient knows that her diet will be changed to NPO at 0800.  PRN dilaudid provided for pain.  Heparin running, rate verified with Charge RN.     Pt is up self with no bed alarm, bed is locked and in lowest position, call light and bedside table are within reach, treaded slipper socks are on, and hourly rounding is in place.

## 2018-10-29 NOTE — PROGRESS NOTES
[facility]    Date of Service: 10/29/2018    Chief Complaint  58 y.o. female admitted 10/24/2018 with dvt and pelvic mass      Interval Problem Update  S/p IVC FILTER PLACEMENT  O.R today    Consultants/Specialty  Surgery  I.R  pulmonary    Disposition  pending        Review of Systems   Constitutional: Positive for weight loss. Negative for chills and fever.   HENT: Negative for ear discharge, ear pain and nosebleeds.    Eyes: Negative for photophobia, pain and discharge.   Respiratory: Negative for sputum production, shortness of breath and wheezing.    Cardiovascular: Negative for orthopnea, claudication and leg swelling.   Gastrointestinal: Positive for abdominal pain. Negative for heartburn, nausea and vomiting.   Genitourinary: Negative for flank pain, frequency and hematuria.   Musculoskeletal: Negative for back pain, myalgias and neck pain.   Skin: Negative for itching and rash.   Neurological: Negative for tremors, sensory change and speech change.      Physical Exam  Laboratory/Imaging   Hemodynamics  Temp (24hrs), Av.9 °C (98.4 °F), Min:36.7 °C (98 °F), Max:37.1 °C (98.8 °F)   Temperature: 36.8 °C (98.2 °F)  Pulse  Av.9  Min: 68  Max: 93    Blood Pressure: 123/75      Respiratory      Respiration: 16, Pulse Oximetry: 97 %        RUL Breath Sounds: Clear, RML Breath Sounds: Clear, RLL Breath Sounds: Clear, TIFF Breath Sounds: Clear    Fluids    Intake/Output Summary (Last 24 hours) at 10/29/18 0902  Last data filed at 10/28/18 1930   Gross per 24 hour   Intake             1200 ml   Output                0 ml   Net             1200 ml       Nutrition  Orders Placed This Encounter   Procedures   • Diet NPO     Standing Status:   Standing     Number of Occurrences:   1     Order Specific Question:   Restrict to:     Answer:   Strict [1]     Physical Exam   Constitutional: She is oriented to person, place, and time. She appears well-developed and well-nourished.   HENT:   Head:  Normocephalic and atraumatic.   Eyes: Right eye exhibits no discharge. Left eye exhibits no discharge.   Neck: No JVD present.   Cardiovascular: Normal heart sounds.    Pulmonary/Chest: No stridor. No respiratory distress. She has no wheezes. She has no rales.   Abdominal: There is tenderness. There is no rebound and no guarding.   Musculoskeletal: She exhibits no edema or tenderness.   Neurological: She is alert and oriented to person, place, and time.   Skin: Skin is warm and dry.       Recent Labs      10/29/18   0439   WBC  4.9   RBC  3.14*   HEMOGLOBIN  10.3*   HEMATOCRIT  29.8*   MCV  94.9   MCH  32.8   MCHC  34.6   RDW  39.6   PLATELETCT  249   MPV  9.5     Recent Labs      10/29/18   0439   SODIUM  137   POTASSIUM  3.8   CHLORIDE  101   CO2  27   GLUCOSE  114*   BUN  9   CREATININE  0.52   CALCIUM  9.0     Recent Labs      10/27/18   0202   10/28/18   1629  10/28/18   2247  10/29/18   0439   APTT  69.9*   < >  52.4*  71.6*  79.6*   INR  1.02   --    --    --    --     < > = values in this interval not displayed.     Recent Labs      10/28/18   1629   BNPBTYPENAT  49              Assessment/Plan     Pelvic mass   Assessment & Plan     Ultrasound showed:1.  Large left adnexal mass is suspicious for neoplasm. Coexisting ovarian torsion cannot be excluded in the appropriate clinical setting. MRI of the pelvis using ovarian mass protocol may be helpful for further evaluation, if clinically indicated.    2.  Nonvisualization of the right ovary    3.  Small amount of free pelvic fluid.  Ct of the abd and pelvis showed:1. A 10.6 cm cystic and solid left adnexal mass, highly concerning for malignancy.  2. Small volume ascites.  3. Tiny hypodensity in the right hepatic lobe, too small to characterize, likely simple cyst.  Surgical input is noted  She is at moderate risk for operation  Bowel regimen  D/w the pt  extensively   D/w dr Martinez and O.R today        Pain   Assessment & Plan    abd pain is better  controlled    dilaudid to every hour        Acute respiratory failure (HCC)   Assessment & Plan    Has resolved  Pulmonary input is noted  cta showed:1.  Acute bilateral pulmonary emboli.  2.  Findings there is a possibility of RIGHT ventricular strain.  3.  Bilateral dependent atelectasis.  Cardiac echo  Showed:No prior study is available for comparison.   Left ventricular ejection fraction is visually estimated to be 60%.  No valve or doppler abnormality.  No sign of cardiac strain        DVT (deep venous thrombosis) ([facility])   Assessment & Plan    On heparin drip  S/p ivc filter placement        Hypothyroidism- (present on admission)   Assessment & Plan    Continue home dose of levothyroxine.  Follow-up with PCP          Quality-Core Measures   Gabriel catheter::  No Gabriel  DVT prophylaxis pharmacological::  Heparin

## 2018-10-29 NOTE — PROGRESS NOTES
Noted and appreciate pulmonary consult.   Discussed with patient. She wants to proceed with surgery thus tentatively surgery scheduled for tomorrow ~ 5:00 pm provided robot and OR is available.     I have reviewed with the patient robotic hysterectomy with BSO is recommended. Frozen section will be performed and if malignancy is found, I recommend a surgical staging which includes pelvic and aortic node dissection with omentectomy and miultiple biopsies. In the event that advanced disease is found, I would recommend a cytoreductive surgery which includes aforementioned procedure and also possible bowel resection, colostomy, peritoneal stripping, diaphragm stripping or resection with repair, and other cytoreductive surgery effort to render her with minimal disease.    Options of treatment have been reviewed with her. The risks, benefits and rationale of the procedures have been reviewed with her. The risk including but not limited to infection, bleeding, possible blood transfusion, injury to bowel, bladder, ureter, nerve injury, possible blood transfusion, fistula formation, bladder atony, sexual dysfunction, hernia formation, reoperation have been reviewed as stated on the [facility] consent form. She is understanding of these risks , all questions were answered and she will like to proceed.      Plan: Will proceed with bowel prep tonite.   NPO after 8 am tomorrow.  Stop heparin ~ 11:00.

## 2018-10-30 PROBLEM — J96.00 ACUTE RESPIRATORY FAILURE (HCC): Status: RESOLVED | Noted: 2018-10-25 | Resolved: 2018-10-30

## 2018-10-30 LAB
ANION GAP SERPL CALC-SCNC: 9 MMOL/L (ref 0–11.9)
BUN SERPL-MCNC: 8 MG/DL (ref 8–22)
CALCIUM SERPL-MCNC: 8 MG/DL (ref 8.5–10.5)
CHLORIDE SERPL-SCNC: 104 MMOL/L (ref 96–112)
CO2 SERPL-SCNC: 21 MMOL/L (ref 20–33)
CREAT SERPL-MCNC: 0.47 MG/DL (ref 0.5–1.4)
ERYTHROCYTE [DISTWIDTH] IN BLOOD BY AUTOMATED COUNT: 40.3 FL (ref 35.9–50)
GLUCOSE SERPL-MCNC: 114 MG/DL (ref 65–99)
HCT VFR BLD AUTO: 26.2 % (ref 37–47)
HGB BLD-MCNC: 8.8 G/DL (ref 12–16)
MCH RBC QN AUTO: 32.2 PG (ref 27–33)
MCHC RBC AUTO-ENTMCNC: 33.6 G/DL (ref 33.6–35)
MCV RBC AUTO: 96 FL (ref 81.4–97.8)
PATHOLOGY CONSULT NOTE: NORMAL
PLATELET # BLD AUTO: 183 K/UL (ref 164–446)
PMV BLD AUTO: 9.9 FL (ref 9–12.9)
POTASSIUM SERPL-SCNC: 4.4 MMOL/L (ref 3.6–5.5)
RBC # BLD AUTO: 2.73 M/UL (ref 4.2–5.4)
SODIUM SERPL-SCNC: 134 MMOL/L (ref 135–145)
WBC # BLD AUTO: 5.3 K/UL (ref 4.8–10.8)

## 2018-10-30 PROCEDURE — A9270 NON-COVERED ITEM OR SERVICE: HCPCS | Performed by: SPECIALIST

## 2018-10-30 PROCEDURE — A9270 NON-COVERED ITEM OR SERVICE: HCPCS | Performed by: INTERNAL MEDICINE

## 2018-10-30 PROCEDURE — 36415 COLL VENOUS BLD VENIPUNCTURE: CPT

## 2018-10-30 PROCEDURE — 700101 HCHG RX REV CODE 250: Performed by: SPECIALIST

## 2018-10-30 PROCEDURE — 700102 HCHG RX REV CODE 250 W/ 637 OVERRIDE(OP): Performed by: SPECIALIST

## 2018-10-30 PROCEDURE — 99232 SBSQ HOSP IP/OBS MODERATE 35: CPT | Performed by: FAMILY MEDICINE

## 2018-10-30 PROCEDURE — 700111 HCHG RX REV CODE 636 W/ 250 OVERRIDE (IP): Performed by: SPECIALIST

## 2018-10-30 PROCEDURE — 80048 BASIC METABOLIC PNL TOTAL CA: CPT

## 2018-10-30 PROCEDURE — 700102 HCHG RX REV CODE 250 W/ 637 OVERRIDE(OP): Performed by: INTERNAL MEDICINE

## 2018-10-30 PROCEDURE — 700111 HCHG RX REV CODE 636 W/ 250 OVERRIDE (IP): Performed by: INTERNAL MEDICINE

## 2018-10-30 PROCEDURE — 770004 HCHG ROOM/CARE - ONCOLOGY PRIVATE *

## 2018-10-30 PROCEDURE — 85027 COMPLETE CBC AUTOMATED: CPT

## 2018-10-30 RX ORDER — HYDROMORPHONE HYDROCHLORIDE 2 MG/ML
2 INJECTION, SOLUTION INTRAMUSCULAR; INTRAVENOUS; SUBCUTANEOUS
Status: DISCONTINUED | OUTPATIENT
Start: 2018-10-30 | End: 2018-11-02 | Stop reason: HOSPADM

## 2018-10-30 RX ORDER — KETOROLAC TROMETHAMINE 30 MG/ML
30 INJECTION, SOLUTION INTRAMUSCULAR; INTRAVENOUS EVERY 6 HOURS
Status: DISCONTINUED | OUTPATIENT
Start: 2018-10-30 | End: 2018-11-01

## 2018-10-30 RX ORDER — OXYCODONE AND ACETAMINOPHEN 7.5; 325 MG/1; MG/1
1-2 TABLET ORAL EVERY 6 HOURS PRN
Status: DISCONTINUED | OUTPATIENT
Start: 2018-10-30 | End: 2018-11-02 | Stop reason: HOSPADM

## 2018-10-30 RX ORDER — HYDROMORPHONE HYDROCHLORIDE 2 MG/ML
2 INJECTION, SOLUTION INTRAMUSCULAR; INTRAVENOUS; SUBCUTANEOUS ONCE
Status: COMPLETED | OUTPATIENT
Start: 2018-10-30 | End: 2018-10-30

## 2018-10-30 RX ADMIN — HYDROMORPHONE HYDROCHLORIDE 2 MG: 2 INJECTION INTRAMUSCULAR; INTRAVENOUS; SUBCUTANEOUS at 10:17

## 2018-10-30 RX ADMIN — POTASSIUM CHLORIDE AND SODIUM CHLORIDE: 450; 150 INJECTION, SOLUTION INTRAVENOUS at 00:41

## 2018-10-30 RX ADMIN — HYDROMORPHONE HYDROCHLORIDE 1 MG: 2 INJECTION INTRAMUSCULAR; INTRAVENOUS; SUBCUTANEOUS at 20:09

## 2018-10-30 RX ADMIN — KETOROLAC TROMETHAMINE 30 MG: 30 INJECTION, SOLUTION INTRAMUSCULAR at 17:08

## 2018-10-30 RX ADMIN — OXYCODONE HYDROCHLORIDE AND ACETAMINOPHEN 1 TABLET: 7.5; 325 TABLET ORAL at 12:21

## 2018-10-30 RX ADMIN — HYDROMORPHONE HYDROCHLORIDE 2 MG: 2 INJECTION INTRAMUSCULAR; INTRAVENOUS; SUBCUTANEOUS at 17:09

## 2018-10-30 RX ADMIN — HYDROMORPHONE HYDROCHLORIDE 1 MG: 2 INJECTION INTRAMUSCULAR; INTRAVENOUS; SUBCUTANEOUS at 22:24

## 2018-10-30 RX ADMIN — LEVOTHYROXINE SODIUM 25 MCG: 25 TABLET ORAL at 04:42

## 2018-10-30 RX ADMIN — ONDANSETRON HYDROCHLORIDE 4 MG: 2 SOLUTION INTRAMUSCULAR; INTRAVENOUS at 18:15

## 2018-10-30 RX ADMIN — KETOROLAC TROMETHAMINE 30 MG: 30 INJECTION, SOLUTION INTRAMUSCULAR at 12:21

## 2018-10-30 RX ADMIN — PROCHLORPERAZINE EDISYLATE 5 MG: 5 INJECTION INTRAMUSCULAR; INTRAVENOUS at 01:02

## 2018-10-30 RX ADMIN — KETOROLAC TROMETHAMINE 30 MG: 30 INJECTION, SOLUTION INTRAMUSCULAR at 04:42

## 2018-10-30 RX ADMIN — LORATADINE 10 MG: 10 TABLET ORAL at 04:42

## 2018-10-30 RX ADMIN — HYDROMORPHONE HYDROCHLORIDE 2 MG: 2 INJECTION INTRAMUSCULAR; INTRAVENOUS; SUBCUTANEOUS at 14:13

## 2018-10-30 RX ADMIN — STANDARDIZED SENNA CONCENTRATE AND DOCUSATE SODIUM 2 TABLET: 8.6; 5 TABLET, FILM COATED ORAL at 17:09

## 2018-10-30 RX ADMIN — STANDARDIZED SENNA CONCENTRATE AND DOCUSATE SODIUM 2 TABLET: 8.6; 5 TABLET, FILM COATED ORAL at 04:42

## 2018-10-30 ASSESSMENT — ENCOUNTER SYMPTOMS
VOMITING: 0
NAUSEA: 1
NEUROLOGICAL NEGATIVE: 1
CARDIOVASCULAR NEGATIVE: 1
ABDOMINAL PAIN: 1
EYES NEGATIVE: 1
RESPIRATORY NEGATIVE: 1
CONSTITUTIONAL NEGATIVE: 1
MUSCULOSKELETAL NEGATIVE: 1

## 2018-10-30 ASSESSMENT — PAIN SCALES - GENERAL
PAINLEVEL_OUTOF10: 8
PAINLEVEL_OUTOF10: 6
PAINLEVEL_OUTOF10: 4
PAINLEVEL_OUTOF10: 8
PAINLEVEL_OUTOF10: 7
PAINLEVEL_OUTOF10: 8
PAINLEVEL_OUTOF10: 8
PAINLEVEL_OUTOF10: 0

## 2018-10-30 ASSESSMENT — LIFESTYLE VARIABLES: DO YOU DRINK ALCOHOL: NO

## 2018-10-30 NOTE — PROGRESS NOTES
[facility] Medicine Daily Progress Note    Date of Service  10/30/2018    Chief Complaint  58 y.o. female admitted 10/24/2018 with history of hypothyroidism, chronic back pain, , epidural injection to L5-S1 2 weeks ago, who presented 10/24/2018 with chronic lower abdominal pain.  Follow-up MRI after the epidural injection demonstrated left 7.7 cm ovarian cyst.  Patient abdominal pain started about in 2018 and has been getting progressively worse it is sharp and dull, constant, worsening with movements,.  This morning pain is gotten acutely worse.  Patient was taken multiple doses of pain medication prescribed for back pain.  Ultrasound in the emergency department shows complex 11 x 11 x 7 cm heterogenous mass in the pelvis with small amount of free fluid.  Ultrasound of lower extremities showed DVT and patient was started on heparin.    Hospital Course    S/p IVC FILTER PLACEMENT (10/27)  S/p Laparoscopy with exploratory laparotomy with ERNST-BSO with resection of left parametrial   tissue and left ureterolysis        Interval Problem Update  Patient complains of postop abdominal pain but notes that it is improving.  at bedside also notes that she is now off Dilaudid PCA and will be transitioned to Percocet.    Consultants/Specialty  Gyn    Code Status  FULL    Disposition  PENDING    Review of Systems  Review of Systems   Constitutional: Negative.    HENT: Negative.    Eyes: Negative.    Respiratory: Negative.    Cardiovascular: Negative.    Gastrointestinal: Positive for abdominal pain and nausea. Negative for vomiting.   Musculoskeletal: Negative.    Neurological: Negative.    All other systems reviewed and are negative.       Physical Exam  Temp:  [36.3 °C (97.4 °F)-37.3 °C (99.2 °F)] 36.8 °C (98.3 °F)  Pulse:  [65-90] 72  Resp:  [13-20] 16  BP: ()/(57-80) 98/57    Physical Exam   Constitutional: She is oriented to person, place, and time. No distress.   HENT:   Head: Normocephalic and  atraumatic.   Eyes: Pupils are equal, round, and reactive to light.   Neck: Normal range of motion. Neck supple.   Cardiovascular: Normal rate, regular rhythm and normal heart sounds.    Pulmonary/Chest: Effort normal and breath sounds normal.   Abdominal: Soft. Bowel sounds are normal.   Musculoskeletal: Normal range of motion.   Neurological: She is alert and oriented to person, place, and time.   Skin: She is not diaphoretic.   Vitals reviewed.      Fluids    Intake/Output Summary (Last 24 hours) at 10/30/18 1122  Last data filed at 10/30/18 0900   Gross per 24 hour   Intake          3094.05 ml   Output             1825 ml   Net          1269.05 ml       Laboratory  Recent Labs      10/29/18   0439  10/30/18   0557   WBC  4.9  5.3   RBC  3.14*  2.73*   HEMOGLOBIN  10.3*  8.8*   HEMATOCRIT  29.8*  26.2*   MCV  94.9  96.0   MCH  32.8  32.2   MCHC  34.6  33.6   RDW  39.6  40.3   PLATELETCT  249  183   MPV  9.5  9.9     Recent Labs      10/29/18   0439  10/30/18   0557   SODIUM  137  134*   POTASSIUM  3.8  4.4   CHLORIDE  101  104   CO2  27  21   GLUCOSE  114*  114*   BUN  9  8   CREATININE  0.52  0.47*   CALCIUM  9.0  8.0*     Recent Labs      10/28/18   1629  10/28/18   2247  10/29/18   0439   APTT  52.4*  71.6*  79.6*     Recent Labs      10/28/18   1629   BNPBTYPENAT  49                Assessment/Plan  Pelvic mass   Assessment & Plan    - s/p  Laparoscopy...  Exploratory laparotomy with ERNST-BSO with resection of left parametrial tissue and left ureterolysis  - Ultrasound showed:1.  Large left adnexal mass is suspicious for neoplasm. Coexisting ovarian torsion cannot be excluded in the appropriate clinical setting. MRI of the pelvis using ovarian mass protocol may be helpful for further evaluation, if clinically indicated.  2.  Nonvisualization of the right ovary  3.  Small amount of free pelvic fluid.  - CT of the abd and pelvis showed:1. A 10.6 cm cystic and solid left adnexal mass, highly concerning for  malignancy.  2. Small volume ascites.  3. Tiny hypodensity in the right hepatic lobe, too small to characterize, likely simple cyst.  - Continue pain meds PRN        DVT (deep venous thrombosis) (HCC)   Assessment & Plan    - S/p ivc filter placement (10/27)  - Continue Lovenox BID        Hypothyroidism- (present on admission)   Assessment & Plan    Continue home dose of levothyroxine.  Follow-up with PCP             VTE prophylaxis: Lovenox

## 2018-10-30 NOTE — CARE PLAN
Problem: Pain Management  Goal: Pain level will decrease to patient's comfort goal    Intervention: Follow pain managment plan developed in collaboration with patient and Interdisciplinary Team  Pt educated on pca pump and how to use; pt able to achieve relief and sleep. IV toradol used with good results.       Problem: Respiratory:  Goal: Respiratory status will improve    Intervention: Educate and encourage incentive spirometry usage  IS at bedside. Educated pt to use 10xhr while awake. Pt able to pull 1000.       Problem: Fluid Volume:  Goal: Will maintain balanced intake and output  Outcome: PROGRESSING AS EXPECTED  Urine output 640 this shift. Pt on IV fluids and tolerating sips of water.

## 2018-10-30 NOTE — OP REPORT
DATE OF SERVICE:  10/29/2018    PREOPERATIVE DIAGNOSES:  1.  Left complex ovarian mass.  2.  Elevated CA-125 of 416.  3.  Left lower quadrant pain.  4.  Recent diagnosis of deep venous thrombosis with pulmonary embolism.  5.  Status post inferior vena cava filter placement.  6.  Status post anticoagulation therapy.  7.  Left ureteral obstruction secondary to malignant compression.    POSTOPERATIVE DIAGNOSES:  1.  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  2.  Status post optimal cytoreductive surgery with no gross residual tumor   remaining.    PROCEDURE PERFORMED:  1.  Laparoscopy.  2.  Exploratory laparotomy with ERNST-BSO with resection of left parametrial   tissue and left ureterolysis.  3.   Left ureterolysis.  4.  Left pelvic and periaortic renal lymphadenectomy.  5.  Complete omentectomy.  6.  Sigmoid mesentery biopsies.    SURGEON:  Pipe Martinez MD    ASSISTANT:  Courtney Mustafa CST/FA    ANESTHESIA:  General.    ANESTHESIOLOGIST:  René Chun MD and ____ Patricio Farah MD    ESTIMATED BLOOD LOSS:  400 mL    FLUIDS:  Per anesthesiologist.    URINE OUTPUT:  350 mL    COMPLICATIONS:  None.    COUNTS:  Final sponge and needle counts correct.    INDICATIONS FOR SURGERY:  The patient is a pleasant 58-year-old female who was   admitted last week after complained about left lower quadrant pain and with a   DVT and pulmonary embolism.  Upon workup, she was found to have a complex   pelvic mass.  CA-125 was drawn and was noted to be elevated at 416.  This was   suspicious to be an ovarian malignancy.  I was consulted.  The patient was   seen by me for consultation.  After undergoing an appropriate medical   clearance workup for her DVT and PE and anticoagulation, patient underwent an   IVC filter placement.  She had been cleared to undergo surgery by   pulmonologist and hospitalist.  Thus, patient is being brought to the   operating room today to undergo laparoscopy evaluation.  The  patient and I had   discussed about possibility of a robotic hysterectomy and BSO versus open   procedure.  Frozen section will be performed and proceed as clinically   indicated.    Risks, benefits, and rationale of the procedures were reviewed with the   patient in detail.  Patient is understanding of these risks and wished to   proceed with the surgery as planned.    Intraoperatively laparoscopic findings revealed a left ovary that was densely   attached to the cul-de-sac.  The sigmoid was densely attached to this, I could   not mobilize it out.  In addition, at the time of the laparoscopy there were   fluids already in the pelvis approximately 10 mL that looks like brownish   fluids.  There were also right hepatic gutter fluids about 100 mL; it was this   finding that led me to convert from laparoscopy to laparotomy.  I felt that   if this was malignancy, I wanted to completely thoroughly irrigate the abdomen   and explore the entire abdomen.  Thus, patient did not undergo a robotic   procedure, but laparoscopy was converted to an exploratory laparotomy.    At the time of the laparotomy, a detailed exploration was undertaken.  There   was no seeding in the right and left diaphragm.  Liver capsule was smooth.    Stomach was grossly normal.  Spleen was unremarkable.  Retrogastric region was   unremarkable.  The fatou hepatis was clear.  The paracolic gutter peritoneum   was free of any seeding.  There was no seeding along the Gerota's fascia   bilaterally.  The small bowel was inspected from ligament of Treitz to the   ileocecal valve.  There was no evidence of disease along the mesentery of the   serosa of the small bowel.  Ascending, transverse, and descending colon is   unremarkable.  The gallbladder was unremarkable.  We did not see the appendix.    In the pelvis, uterus was normal size.  The right tube and ovary was   atrophic.  The left ovary was where the main mass was.  It appeared cystic.    It was  densely attached to the sigmoid mesentery.  There was no seeding along   the bladder, pelvic and cul-de-sac peritoneum.  The left ovary was densely   attached to the left pelvic sidewall.  There was a nodule along the left   periureteral tissue as the ureter enters the ureteric tunnel.  The ureter was   compressed necessitating a complete ureterolysis to free the mass off in order   for us to safely remove the mass without compromising the ureter.    Retroperitoneally, pelvic and periaortic lymph nodes up to the level of the   renal vessels were nonsuspicious.    PROCEDURE NOTE:  Patient was given IV antibiotics prior to procedure.  Patient   was prepped and draped and placed in modified dorsal lithotomy position.  We   began with a 5 mm vertical incision infraumbilically.  A Veress needle was   introduced.  Abdominal pressure was noted to be less than 5.  Pneumoperitoneum   was achieved to abdominal pressure of 15 mmHg.  After completion of this, a 5   mm trocar was inserted and an exploratory laparoscopy was undertaken.  I then   placed in the left upper quadrant an 8 mm robotic port with the initial plan   of proceeding with a robotic procedure in the event that we could.  This port   was placed and it afforded me to place my Prestige grasper and as I put this,   on trying to mobilize the omentum, which was densely attached to the mass away   from the mass, it was densely attached, thus after I saw the fluids, both in   the right hepatic gutter and pelvic fluid, I felt that this most likely   represent malignancy.  I elected to convert from a laparoscopy to an open   laparotomy.  At this point in time, the instrumentation and trocars were   withdrawn.  We proceeded on with a midline incision from the suprapubic bone   and above the umbilicus.  Abdominal cavity was entered in the usual standard   fashion without injuring the bowel.  After entering the abdomen, detail   exploration was undertaken.  The findings are  noted as above.  My initial   focus was turned towards the left adnexectomy.    Initially, we opened up the left posterior broad leaf ligament.  The left   ureter was identified.  This was densely attached to the mass and sigmoid   mesentery.  Superior to this, the ovarian vessels skeletonized, isolated, and   divided with the LigaSure.  I  the left ovarian mass away from the   mid sigmoid mesentery bluntly.  There was some tissue left on the sigmoid   mesentery which was ultimately biopsied.  The left ovary was mobilized.  The   left utero-ovarian ligament was skeletonized, isolated, and divided with the   LigaSure.  This was sent for frozen section.  The remaining tissue on the left   pelvic side was with a nodule densely attached to the ureter.  Complete   ureterolysis had to be undertaken.  The left ureter was dissected off the   medial leaf of the peritoneum.  All the disease along the left pelvic sidewall   peritoneum was resected to the level of the ureteric tunnel.  The left   uterine artery and vein was subsequently skeletonized and isolated with right   angle clamps and clamped lateral to the ureter and this pedicle was divided.    The course of the ureter was followed into the ureteric tunnel.  I dissected   the left parametria along the left pelvic sidewall peritoneum.  The bladder   peritoneum was taken down.  The right posterior broad leaf ligament was   incised.  The right ureter was identified.  The right ovarian vessels were   skeletonized, isolated, divided with the LigaSure medially, the peritoneum was   incised down to level of the uterosacral ligament.  The right round ligament   was divided followed by the anterior broad leaf ligament.  The bladder   peritoneum was further taken down.  The cardinal ligament was dense on the   right side that was taken down with the LigaSure down to level of the   uterosacral ligament.  I dissected the bladder away from the paracervical   fascia.  The  uterosacral ligaments were then clamped with right angle Zeppelin   clamp and along the apex of the vagina.  The cervix was then amputated from   the vagina to complete the hysterectomy with right salpingo-oophorectomy.  The   vaginal cuff was closed with 0 Vicryl suture in the usual standard fashion.    The pelvis was then copiously irrigated with water, hemostasis was   established.  I then biopsied the dense scarring adhesions from the sigmoid   mesentery where the mass was attached.  This was sent for pathological   analysis.  It appears that the patient has the frozen section came back as   endometrioid adenocarcinoma of the left ovary.  There was no other gross   residual disease.  I proceeded on with the remainder of the surgical staging.    At this point in time, a complete omentectomy was undertaken.  The infracolic   omentum was taken off the transverse colon.  The lesser sac was entered.  I   then developed a mesocolon and the gastrocolic omentum.  The right   gastrohepatic ligament was skeletonized, isolated, divided with the LigaSure.    The remainder of the gastrocolic omentum was removed.  We preserved the   gastro-omental vessels.  After complete omentectomy, I then turned my focus   towards the left periaortic renal lymphadenectomy.    At this point in time, the peritoneum was incised in the midline in   transperitoneal approach parallel to the aorta and vena cava up to level of   the duodenum.  The bowel was then packed cephalad exposing the left periaortic   lymph nodes.  All the lymphoid baring tissues from the common iliac up to the   level of the left renal vein were resected.  The PEGGY was preserved.  The left   ovarian vessels were skeletonized, isolated, and the course of the left   ovarian vein was followed into the left renal vein and the left ovarian vein,   Hemoclips were applied right at where it ended to the renal vein and the left   ovarian vein was resected.  After completion of this,  we then performed a left   pelvic lymph node dissection.  There were minimal lymphoid tissues of note.    Although lymphoid bearing tissues along the external iliac artery and vein was   skeletonized off the vessels followed by the lymphoid bearing tissues along   the obturator fossa.  The neurovascular bundle of nerve, artery, and vein were   preserved and not compromised.  The boundary of the pelvic lymph node   dissection distally was the external circumflex iliac vein, laterally the   psoas muscle, medially the superior vesicle artery along with the ureter and   inferiorly below the obturator nerve.  After completion of this, we then   copiously irrigated the entire abdomen and pelvis with approximately 10 liters   of water.  This was after irrigation.  We checked the integrity of the   ureter.  The ureter did not appear to be compromised.  Given that the ureter   appears in good shape, we did not place the ureteral stent.  I then placed the   bowel back to its normal position.  The bowel was meticulously inspected   several times to ensure that there was no evidence of injury to the bowel.    Once this was established, we placed a JUSTO drain to the left upper quadrant.    The bowel was placed back to its normal position.  Seprafilm was placed in the   anterior abdominal wall.  The fascia was subsequently closed with #2 Vicryl   suture in a mass closure fashion.  The subcutaneous fat was copiously   irrigated with water.  The skin was reapproximated with 3-0 Monocryl sutures.    At the conclusion of my procedure, there was no gross residual tumor   remaining.  Patient tolerated procedure well without any difficulties and was   subsequently transferred to the PACU in stable condition, extubated.       ____________________________________     MD EDDI KEYS / NTS    DD:  10/29/2018 21:53:31  DT:  10/29/2018 23:12:22    D#:  [id number]  Job#:  [id number]    cc: Tawanna Alves MD, Akbar Landis MD

## 2018-10-30 NOTE — PROGRESS NOTES
Pt's , Aryan, up to floor after pt left unit for surgery. Gave him pt's cell phone and wedding ring (in specimen cup). Told him we would contact him when staff knew what floor and room pt would go to after surgery. Aryan's phone number given to NOC RN.

## 2018-10-30 NOTE — CARE PLAN
Problem: Pain Management  Goal: Pain level will decrease to patient's comfort goal    Intervention: Follow pain managment plan developed in collaboration with patient and Interdisciplinary Team  pca infusing        Problem: Safety  Goal: Will remain free from injury  Call light within reach, treaded socks in place, bed in lowest position and locked.  Hourly rounding in progress.

## 2018-10-30 NOTE — OR SURGEON
Immediate Post OP Note    PreOp Diagnosis:Pelvic mass/PE/DVT/S/P IVC filter/Elevated  416    PostOp Diagnosis: stage II vs III endometrioid owen of the left ovary. S/P optimal cytoreductive surgery.     Procedure(s):  LAPAROSCOPY ROBOTIC XI  EXPLORATORY LAPAROTOMY - Wound Class: Clean Contaminated  HYSTERECTOMY RADICAL    Surgeon(s):  Pipe Martinez M.D.    Anesthesiologist/Type of Anesthesia:  Anesthesiologist: Patricio Farah M.D.; René Chun M.D./* No anesthesia type entered *    Surgical Staff:  Circulator: June Leung R.N.  Scrub Person: Nir Contreras Assist: Courtney Mustafa    Specimens removed if any:  ID Type Source Tests Collected by Time Destination   A : Lefrt fallopian Tube and Ovary Tissue Frozen Tissue FROZEN SECTION REQUEST Pipe Martinez M.D. 10/29/2018  8:21 PM    B : Omentum Tissue Abdominal PATHOLOGY SPECIMEN Pipe Martinez M.D. 10/29/2018  8:22 PM    C : sigmoid serosa Tissue Abdominal PATHOLOGY SPECIMEN Pipe Martinez M.D. 10/29/2018  8:22 PM    D : Left Perimetrium Tissue Abdominal PATHOLOGY SPECIMEN Pipe Martinez M.D. 10/29/2018  8:23 PM    E : Abdominal Pelvic Fluid Body Fluid Abdominal PATHOLOGY SPECIMEN Pipe Martinez M.D. 10/29/2018  8:24 PM    F : Left Supra PEGGY Node Tissue Abdominal PATHOLOGY SPECIMEN Pipe Martinez M.D. 10/29/2018  8:43 PM    G : Left infra PEGGY node Tissue Abdominal PATHOLOGY SPECIMEN Pipe Martinez M.D. 10/29/2018  8:49 PM    H : Left Ovarian Vein Tissue Abdominal PATHOLOGY SPECIMEN Pipe Martinez M.D. 10/29/2018  8:57 PM    I : Left Pelvic Node Tissue Abdominal PATHOLOGY SPECIMEN Pipe Martinez M.D. 10/29/2018  8:57 PM    J : Left Obturator Tissue Abdominal PATHOLOGY SPECIMEN Pipe Martinez M.D. 10/29/2018  9:05 PM        Estimated Blood Loss: 400 cc    Findings: Enlarged left ovary adherent to the sigmoid mesentery and left pelvic side wall densely attached to the left ureter, otherwise no evidence of disease outside of the pelvis. Fluids ~ 200 cc      Complications: none        10/29/2018 9:35 PM Pipe Martinez M.D.

## 2018-10-30 NOTE — PROGRESS NOTES
Gyn Oncology Progress Note               Author: Екатерина Joseph Date & Time created: 10/30/2018  8:30 AM     Interval History:  Pt c/o sinus/post nasal drip complaints and dry eye on the right side. Pain better controlled with Dilaudid and Percocet has helped in the past. Zofran helped with nausea this AM    Review of Systems:  Review of Systems   HENT: Positive for congestion.    Gastrointestinal: Positive for abdominal pain and nausea. Negative for vomiting.       Physical Exam:  Physical Exam   Constitutional: She appears well-developed and well-nourished.   Abdominal:   Incision healing well, JUSTO on left upper quad with serosang output   Musculoskeletal: She exhibits no edema or tenderness.       Labs:      Recent Labs      10/28/18   1629   TROPONINI  <0.01   BNPBTYPENAT  49     Recent Labs      10/29/18   0439  10/30/18   0557   SODIUM  137  134*   POTASSIUM  3.8  4.4   CHLORIDE  101  104   CO2  27  21   BUN  9  8   CREATININE  0.52  0.47*   CALCIUM  9.0  8.0*     Recent Labs      10/29/18   0439  10/30/18   0557   ALTSGPT  8   --    ASTSGOT  12   --    ALKPHOSPHAT  66   --    TBILIRUBIN  0.4   --    GLUCOSE  114*  114*     Recent Labs      10/28/18   1629  10/28/18   2247  10/29/18   0439  10/30/18   0557   RBC   --    --   3.14*  2.73*   HEMOGLOBIN   --    --   10.3*  8.8*   HEMATOCRIT   --    --   29.8*  26.2*   PLATELETCT   --    --   249  183   APTT  52.4*  71.6*  79.6*   --      Recent Labs      10/29/18   0439  10/30/18   0557   WBC  4.9  5.3   NEUTSPOLYS  51.80   --    LYMPHOCYTES  31.50   --    MONOCYTES  9.80   --    EOSINOPHILS  5.90   --    BASOPHILS  0.60   --    ASTSGOT  12   --    ALTSGPT  8   --    ALKPHOSPHAT  66   --    TBILIRUBIN  0.4   --      Recent Labs      10/29/18   0439  10/30/18   0557   SODIUM  137  134*   POTASSIUM  3.8  4.4   CHLORIDE  101  104   CO2  27  21   GLUCOSE  114*  114*   BUN  9  8   CREATININE  0.52  0.47*   CALCIUM  9.0  8.0*     Hemodynamics:  Temp (24hrs), Av.9 °C  (98.4 °F), Min:36.3 °C (97.4 °F), Max:37.3 °C (99.2 °F)  Temperature: 36.3 °C (97.4 °F)  Pulse  Av  Min: 65  Max: 93Heart Rate (Monitored): 68  Blood Pressure: 105/57, NIBP: (!) 90/61     Respiratory:    Respiration: 18, Pulse Oximetry: 95 %        RUL Breath Sounds: Clear, RML Breath Sounds: Clear, RLL Breath Sounds: Diminished, TIFF Breath Sounds: Clear, LLL Breath Sounds: Diminished  Fluids:    Intake/Output Summary (Last 24 hours) at 10/30/18 0830  Last data filed at 10/30/18 0655   Gross per 24 hour   Intake          3197.45 ml   Output             1725 ml   Net          1472.45 ml        GI/Nutrition:  Orders Placed This Encounter   Procedures   • Diet Order Clear Liquids - No Red Foods     Standing Status:   Standing     Number of Occurrences:   1     Order Specific Question:   Diet:     Answer:   Clear Liquids - No Red Foods [12]     Medical Decision Making, by Problem:  Active Hospital Problems    Diagnosis   • Pelvic mass [R19.00]   • Pain [R52]   • DVT (deep venous thrombosis) (HCC) [I82.409]   • Acute respiratory failure (HCC) [J96.00]   • Hypothyroidism [E03.9]       Plan:  1. POD # 1 s/p lsc conver robot ERNST/BSO- Will adjust pain medications for IV Dilaudid and d/c PCA and use Toradol. Start PO pain pills if no nausea. D/w pt sitting and ambulating. Encourage IS. ADAT.  2. Dry eyes - ok to use OTC lubrication from home  3. DVT s/p IVF filter placement on 10/27/18. Currently resumed on Lovenox. Recommend Coumadin post operatively as easier ability to reverse.  4.  Hypothyroidism, - continue with home medication    Case d/w Dr. Martinez       Quality-Core Measures

## 2018-10-30 NOTE — PROGRESS NOTES
Assumed pt care at 0715.  Received report from night RN.  Assessment completed.  Pt AAOx4.  Pt has no comlaints of pain at this time.  PCA infusing.  No other s/s of discomfort or distress.  Gabriel and JUSTO present. Bed in lowest position, locked, and bed alarm is on.  Pt calls appropriately.  Treaded socks in place, call light within reach and staff numbers provided.  Pt needs met at this time.

## 2018-10-30 NOTE — PROGRESS NOTES
Pt arrived on unit accompanied by  Arayn.   2 PIVs, fluids and PCA running, PCA morphine basal 2 mg, bolus 0.5 mg.   JUSTO to left abdomen, sanguinous output.   Gabriel to dd, yellow output.  on, satting mid 90's on 3 L nc.   Pt medicated for nausea with compazine, good results.   Dressings to abdomen and JUSTO, CDI. Ice on.   Treaded socks, sequentials on. VSS.

## 2018-10-30 NOTE — PROGRESS NOTES
No new changes. Patient underwent bowel prep without any issues. Ready to proceed with surgery as outlined on my consultation.

## 2018-10-30 NOTE — PROGRESS NOTES
Pt down to Pre-op for surgery w/ transport via hospital bed.  called to come collect her belongings: iphone and wedding ring locked in pt's cabinet until he arrives.  Dr. Martinez said pt would be transferring to an oncology bed after surgery. Bed control notified of the future transfer, the rest of pt's belongings will be at her current bedside until new room is assigned. , Aryan, then needs to be called and notified where to take these belongings @[phone].    Pre-op checklist done, consent for surgery signed and in hospital chart. Pt showered and wiped down w/ CHG around noon. Last medicated at 1600 w/ dilaudid for pain.

## 2018-10-31 LAB
ANION GAP SERPL CALC-SCNC: 5 MMOL/L (ref 0–11.9)
BASOPHILS # BLD AUTO: 0.4 % (ref 0–1.8)
BASOPHILS # BLD: 0.02 K/UL (ref 0–0.12)
BUN SERPL-MCNC: 6 MG/DL (ref 8–22)
CALCIUM SERPL-MCNC: 8 MG/DL (ref 8.5–10.5)
CHLORIDE SERPL-SCNC: 101 MMOL/L (ref 96–112)
CO2 SERPL-SCNC: 23 MMOL/L (ref 20–33)
CREAT SERPL-MCNC: 0.56 MG/DL (ref 0.5–1.4)
EOSINOPHIL # BLD AUTO: 0.25 K/UL (ref 0–0.51)
EOSINOPHIL NFR BLD: 4.6 % (ref 0–6.9)
ERYTHROCYTE [DISTWIDTH] IN BLOOD BY AUTOMATED COUNT: 39.7 FL (ref 35.9–50)
GLUCOSE SERPL-MCNC: 128 MG/DL (ref 65–99)
HCT VFR BLD AUTO: 24.5 % (ref 37–47)
HGB BLD-MCNC: 8.1 G/DL (ref 12–16)
IMM GRANULOCYTES # BLD AUTO: 0.01 K/UL (ref 0–0.11)
IMM GRANULOCYTES NFR BLD AUTO: 0.2 % (ref 0–0.9)
LYMPHOCYTES # BLD AUTO: 0.79 K/UL (ref 1–4.8)
LYMPHOCYTES NFR BLD: 14.7 % (ref 22–41)
MCH RBC QN AUTO: 31.5 PG (ref 27–33)
MCHC RBC AUTO-ENTMCNC: 33.1 G/DL (ref 33.6–35)
MCV RBC AUTO: 95.3 FL (ref 81.4–97.8)
MONOCYTES # BLD AUTO: 0.41 K/UL (ref 0–0.85)
MONOCYTES NFR BLD AUTO: 7.6 % (ref 0–13.4)
NEUTROPHILS # BLD AUTO: 3.9 K/UL (ref 2–7.15)
NEUTROPHILS NFR BLD: 72.5 % (ref 44–72)
NRBC # BLD AUTO: 0 K/UL
NRBC BLD-RTO: 0 /100 WBC
PLATELET # BLD AUTO: 181 K/UL (ref 164–446)
PMV BLD AUTO: 9.8 FL (ref 9–12.9)
POTASSIUM SERPL-SCNC: 4.8 MMOL/L (ref 3.6–5.5)
RBC # BLD AUTO: 2.57 M/UL (ref 4.2–5.4)
SODIUM SERPL-SCNC: 129 MMOL/L (ref 135–145)
WBC # BLD AUTO: 5.4 K/UL (ref 4.8–10.8)

## 2018-10-31 PROCEDURE — 80048 BASIC METABOLIC PNL TOTAL CA: CPT

## 2018-10-31 PROCEDURE — 700102 HCHG RX REV CODE 250 W/ 637 OVERRIDE(OP): Performed by: SPECIALIST

## 2018-10-31 PROCEDURE — 700101 HCHG RX REV CODE 250: Performed by: SPECIALIST

## 2018-10-31 PROCEDURE — 700102 HCHG RX REV CODE 250 W/ 637 OVERRIDE(OP): Performed by: INTERNAL MEDICINE

## 2018-10-31 PROCEDURE — A9270 NON-COVERED ITEM OR SERVICE: HCPCS | Performed by: SPECIALIST

## 2018-10-31 PROCEDURE — 36415 COLL VENOUS BLD VENIPUNCTURE: CPT

## 2018-10-31 PROCEDURE — A9270 NON-COVERED ITEM OR SERVICE: HCPCS | Performed by: INTERNAL MEDICINE

## 2018-10-31 PROCEDURE — 85025 COMPLETE CBC W/AUTO DIFF WBC: CPT

## 2018-10-31 PROCEDURE — 700111 HCHG RX REV CODE 636 W/ 250 OVERRIDE (IP): Performed by: SPECIALIST

## 2018-10-31 PROCEDURE — 99232 SBSQ HOSP IP/OBS MODERATE 35: CPT | Performed by: FAMILY MEDICINE

## 2018-10-31 PROCEDURE — 770004 HCHG ROOM/CARE - ONCOLOGY PRIVATE *

## 2018-10-31 RX ADMIN — LEVOTHYROXINE SODIUM 25 MCG: 25 TABLET ORAL at 06:13

## 2018-10-31 RX ADMIN — HYDROMORPHONE HYDROCHLORIDE 2 MG: 2 INJECTION INTRAMUSCULAR; INTRAVENOUS; SUBCUTANEOUS at 20:01

## 2018-10-31 RX ADMIN — STANDARDIZED SENNA CONCENTRATE AND DOCUSATE SODIUM 2 TABLET: 8.6; 5 TABLET, FILM COATED ORAL at 18:08

## 2018-10-31 RX ADMIN — HYDROMORPHONE HYDROCHLORIDE 2 MG: 2 INJECTION INTRAMUSCULAR; INTRAVENOUS; SUBCUTANEOUS at 14:45

## 2018-10-31 RX ADMIN — KETOROLAC TROMETHAMINE 30 MG: 30 INJECTION, SOLUTION INTRAMUSCULAR at 12:05

## 2018-10-31 RX ADMIN — ENOXAPARIN SODIUM 60 MG: 100 INJECTION SUBCUTANEOUS at 00:16

## 2018-10-31 RX ADMIN — ENOXAPARIN SODIUM 60 MG: 100 INJECTION SUBCUTANEOUS at 18:08

## 2018-10-31 RX ADMIN — STANDARDIZED SENNA CONCENTRATE AND DOCUSATE SODIUM 2 TABLET: 8.6; 5 TABLET, FILM COATED ORAL at 06:13

## 2018-10-31 RX ADMIN — POTASSIUM CHLORIDE AND SODIUM CHLORIDE: 450; 150 INJECTION, SOLUTION INTRAVENOUS at 01:11

## 2018-10-31 RX ADMIN — POTASSIUM CHLORIDE AND SODIUM CHLORIDE: 450; 150 INJECTION, SOLUTION INTRAVENOUS at 11:17

## 2018-10-31 RX ADMIN — HYDROMORPHONE HYDROCHLORIDE 2 MG: 2 INJECTION INTRAMUSCULAR; INTRAVENOUS; SUBCUTANEOUS at 04:18

## 2018-10-31 RX ADMIN — POTASSIUM CHLORIDE AND SODIUM CHLORIDE: 450; 150 INJECTION, SOLUTION INTRAVENOUS at 19:42

## 2018-10-31 RX ADMIN — HYDROMORPHONE HYDROCHLORIDE 2 MG: 2 INJECTION INTRAMUSCULAR; INTRAVENOUS; SUBCUTANEOUS at 01:16

## 2018-10-31 RX ADMIN — KETOROLAC TROMETHAMINE 30 MG: 30 INJECTION, SOLUTION INTRAMUSCULAR at 06:12

## 2018-10-31 RX ADMIN — KETOROLAC TROMETHAMINE 30 MG: 30 INJECTION, SOLUTION INTRAMUSCULAR at 18:08

## 2018-10-31 RX ADMIN — LORATADINE 10 MG: 10 TABLET ORAL at 06:13

## 2018-10-31 RX ADMIN — HYDROMORPHONE HYDROCHLORIDE 2 MG: 2 INJECTION INTRAMUSCULAR; INTRAVENOUS; SUBCUTANEOUS at 09:27

## 2018-10-31 RX ADMIN — ONDANSETRON HYDROCHLORIDE 4 MG: 2 SOLUTION INTRAMUSCULAR; INTRAVENOUS at 20:05

## 2018-10-31 RX ADMIN — KETOROLAC TROMETHAMINE 30 MG: 30 INJECTION, SOLUTION INTRAMUSCULAR at 00:16

## 2018-10-31 RX ADMIN — ENOXAPARIN SODIUM 60 MG: 100 INJECTION SUBCUTANEOUS at 09:27

## 2018-10-31 RX ADMIN — KETOROLAC TROMETHAMINE 30 MG: 30 INJECTION, SOLUTION INTRAMUSCULAR at 23:20

## 2018-10-31 ASSESSMENT — PAIN SCALES - GENERAL
PAINLEVEL_OUTOF10: 6
PAINLEVEL_OUTOF10: 4
PAINLEVEL_OUTOF10: 7
PAINLEVEL_OUTOF10: 8

## 2018-10-31 ASSESSMENT — ENCOUNTER SYMPTOMS
EYES NEGATIVE: 1
RESPIRATORY NEGATIVE: 1
CONSTITUTIONAL NEGATIVE: 1
NEUROLOGICAL NEGATIVE: 1
CARDIOVASCULAR NEGATIVE: 1
ABDOMINAL PAIN: 1
MUSCULOSKELETAL NEGATIVE: 1

## 2018-10-31 ASSESSMENT — PATIENT HEALTH QUESTIONNAIRE - PHQ9
2. FEELING DOWN, DEPRESSED, IRRITABLE, OR HOPELESS: NOT AT ALL
SUM OF ALL RESPONSES TO PHQ9 QUESTIONS 1 AND 2: 0
1. LITTLE INTEREST OR PLEASURE IN DOING THINGS: NOT AT ALL

## 2018-10-31 NOTE — PROGRESS NOTES
Received report from day RN, assumed care of PT at 1900. PT A&Ox 4, VSS, PIVx2, midline incision CDI, JUSTO with serosanguinous output, gamboa to gravity, SCD's on, family at bedside,  discussed plan of care for this shift.  Pain managed with meds per MAR. PT up x1, safety precautions in place. Call light and personal possessions within reach.

## 2018-10-31 NOTE — PROGRESS NOTES
Assumed care of patient @0700. Pt A&O x4, on 3L O2; up self. Pain managed with meds per MAR. Midline incision, JUSTO drain with serosanguinous  Output, gamboa in place. Safety precautions in place; bed in lowest position, treaded socks at bedside, call light within reach. All needs met at this time.

## 2018-10-31 NOTE — CARE PLAN
Problem: Pain Management  Goal: Pain level will decrease to patient's comfort goal  Outcome: PROGRESSING AS EXPECTED  PT medicated for pain per the MAR with scheduled and PRN medications   PT and family educated of PT safety with use of opioid medications   PT and family educated on pain scale       Problem: Safety  Goal: Will remain free from injury  Outcome: PROGRESSING AS EXPECTED  Educated PT on use of call light   PT oriented to room, all possessions within reach, call light within reach   Slip resistant socks on PT (yellow if fall risk)

## 2018-11-01 LAB
ANION GAP SERPL CALC-SCNC: 5 MMOL/L (ref 0–11.9)
BASOPHILS # BLD AUTO: 0.5 % (ref 0–1.8)
BASOPHILS # BLD: 0.02 K/UL (ref 0–0.12)
BUN SERPL-MCNC: 5 MG/DL (ref 8–22)
CALCIUM SERPL-MCNC: 7.9 MG/DL (ref 8.5–10.5)
CHLORIDE SERPL-SCNC: 104 MMOL/L (ref 96–112)
CO2 SERPL-SCNC: 26 MMOL/L (ref 20–33)
CREAT SERPL-MCNC: 0.49 MG/DL (ref 0.5–1.4)
EOSINOPHIL # BLD AUTO: 0.35 K/UL (ref 0–0.51)
EOSINOPHIL NFR BLD: 8.1 % (ref 0–6.9)
ERYTHROCYTE [DISTWIDTH] IN BLOOD BY AUTOMATED COUNT: 40.5 FL (ref 35.9–50)
GLUCOSE SERPL-MCNC: 122 MG/DL (ref 65–99)
HCT VFR BLD AUTO: 21.8 % (ref 37–47)
HGB BLD-MCNC: 7.1 G/DL (ref 12–16)
IMM GRANULOCYTES # BLD AUTO: 0.03 K/UL (ref 0–0.11)
IMM GRANULOCYTES NFR BLD AUTO: 0.7 % (ref 0–0.9)
LYMPHOCYTES # BLD AUTO: 0.91 K/UL (ref 1–4.8)
LYMPHOCYTES NFR BLD: 21.1 % (ref 22–41)
MCH RBC QN AUTO: 31.8 PG (ref 27–33)
MCHC RBC AUTO-ENTMCNC: 32.6 G/DL (ref 33.6–35)
MCV RBC AUTO: 97.8 FL (ref 81.4–97.8)
MONOCYTES # BLD AUTO: 0.35 K/UL (ref 0–0.85)
MONOCYTES NFR BLD AUTO: 8.1 % (ref 0–13.4)
NEUTROPHILS # BLD AUTO: 2.66 K/UL (ref 2–7.15)
NEUTROPHILS NFR BLD: 61.5 % (ref 44–72)
NRBC # BLD AUTO: 0 K/UL
NRBC BLD-RTO: 0 /100 WBC
PLATELET # BLD AUTO: 182 K/UL (ref 164–446)
PMV BLD AUTO: 9.7 FL (ref 9–12.9)
POTASSIUM SERPL-SCNC: 4.3 MMOL/L (ref 3.6–5.5)
RBC # BLD AUTO: 2.23 M/UL (ref 4.2–5.4)
SODIUM SERPL-SCNC: 135 MMOL/L (ref 135–145)
TSH SERPL DL<=0.005 MIU/L-ACNC: 1.65 UIU/ML (ref 0.38–5.33)
WBC # BLD AUTO: 4.3 K/UL (ref 4.8–10.8)

## 2018-11-01 PROCEDURE — 700102 HCHG RX REV CODE 250 W/ 637 OVERRIDE(OP): Performed by: SPECIALIST

## 2018-11-01 PROCEDURE — A9270 NON-COVERED ITEM OR SERVICE: HCPCS | Performed by: SPECIALIST

## 2018-11-01 PROCEDURE — 700102 HCHG RX REV CODE 250 W/ 637 OVERRIDE(OP): Performed by: INTERNAL MEDICINE

## 2018-11-01 PROCEDURE — 80048 BASIC METABOLIC PNL TOTAL CA: CPT

## 2018-11-01 PROCEDURE — 85025 COMPLETE CBC W/AUTO DIFF WBC: CPT

## 2018-11-01 PROCEDURE — 84443 ASSAY THYROID STIM HORMONE: CPT

## 2018-11-01 PROCEDURE — A9270 NON-COVERED ITEM OR SERVICE: HCPCS | Performed by: INTERNAL MEDICINE

## 2018-11-01 PROCEDURE — 36415 COLL VENOUS BLD VENIPUNCTURE: CPT

## 2018-11-01 PROCEDURE — 770004 HCHG ROOM/CARE - ONCOLOGY PRIVATE *

## 2018-11-01 PROCEDURE — 700111 HCHG RX REV CODE 636 W/ 250 OVERRIDE (IP): Performed by: SPECIALIST

## 2018-11-01 PROCEDURE — 700111 HCHG RX REV CODE 636 W/ 250 OVERRIDE (IP): Performed by: INTERNAL MEDICINE

## 2018-11-01 PROCEDURE — 99232 SBSQ HOSP IP/OBS MODERATE 35: CPT | Performed by: FAMILY MEDICINE

## 2018-11-01 RX ORDER — WARFARIN SODIUM 7.5 MG/1
7.5 TABLET ORAL
Status: COMPLETED | OUTPATIENT
Start: 2018-11-01 | End: 2018-11-01

## 2018-11-01 RX ADMIN — OXYCODONE HYDROCHLORIDE AND ACETAMINOPHEN 2 TABLET: 7.5; 325 TABLET ORAL at 15:10

## 2018-11-01 RX ADMIN — KETOROLAC TROMETHAMINE 30 MG: 30 INJECTION, SOLUTION INTRAMUSCULAR at 17:50

## 2018-11-01 RX ADMIN — LORATADINE 10 MG: 10 TABLET ORAL at 06:00

## 2018-11-01 RX ADMIN — WARFARIN SODIUM 7.5 MG: 7.5 TABLET ORAL at 21:51

## 2018-11-01 RX ADMIN — OXYCODONE HYDROCHLORIDE AND ACETAMINOPHEN 2 TABLET: 7.5; 325 TABLET ORAL at 21:51

## 2018-11-01 RX ADMIN — ONDANSETRON 4 MG: 4 TABLET, ORALLY DISINTEGRATING ORAL at 06:01

## 2018-11-01 RX ADMIN — OXYCODONE HYDROCHLORIDE AND ACETAMINOPHEN 1 TABLET: 7.5; 325 TABLET ORAL at 09:21

## 2018-11-01 RX ADMIN — ENOXAPARIN SODIUM 60 MG: 100 INJECTION SUBCUTANEOUS at 06:00

## 2018-11-01 RX ADMIN — KETOROLAC TROMETHAMINE 30 MG: 30 INJECTION, SOLUTION INTRAMUSCULAR at 06:00

## 2018-11-01 RX ADMIN — LEVOTHYROXINE SODIUM 25 MCG: 25 TABLET ORAL at 06:00

## 2018-11-01 RX ADMIN — HYDROMORPHONE HYDROCHLORIDE 2 MG: 2 INJECTION INTRAMUSCULAR; INTRAVENOUS; SUBCUTANEOUS at 05:08

## 2018-11-01 RX ADMIN — ENOXAPARIN SODIUM 60 MG: 100 INJECTION SUBCUTANEOUS at 17:51

## 2018-11-01 RX ADMIN — HYDROMORPHONE HYDROCHLORIDE 2 MG: 2 INJECTION INTRAMUSCULAR; INTRAVENOUS; SUBCUTANEOUS at 01:29

## 2018-11-01 RX ADMIN — KETOROLAC TROMETHAMINE 30 MG: 30 INJECTION, SOLUTION INTRAMUSCULAR at 11:55

## 2018-11-01 ASSESSMENT — ENCOUNTER SYMPTOMS
MUSCULOSKELETAL NEGATIVE: 1
CONSTITUTIONAL NEGATIVE: 1
ABDOMINAL PAIN: 1
EYES NEGATIVE: 1
NEUROLOGICAL NEGATIVE: 1
CARDIOVASCULAR NEGATIVE: 1
RESPIRATORY NEGATIVE: 1

## 2018-11-01 ASSESSMENT — PATIENT HEALTH QUESTIONNAIRE - PHQ9
2. FEELING DOWN, DEPRESSED, IRRITABLE, OR HOPELESS: NOT AT ALL
1. LITTLE INTEREST OR PLEASURE IN DOING THINGS: NOT AT ALL
SUM OF ALL RESPONSES TO PHQ9 QUESTIONS 1 AND 2: 0

## 2018-11-01 ASSESSMENT — PAIN SCALES - GENERAL
PAINLEVEL_OUTOF10: 4
PAINLEVEL_OUTOF10: 0
PAINLEVEL_OUTOF10: 5
PAINLEVEL_OUTOF10: 4
PAINLEVEL_OUTOF10: 6
PAINLEVEL_OUTOF10: 6
PAINLEVEL_OUTOF10: 0
PAINLEVEL_OUTOF10: 4
PAINLEVEL_OUTOF10: 6

## 2018-11-01 NOTE — DISCHARGE PLANNING
Anticipated Discharge Disposition: Home    Action: Reviewed chart.  Pt transferred from S610 to R322 on 10/30/18.   POD #4 for Laparoscopy, Exploratory Laparotomy, Radical Hysterectomy.  Pt advanced from clear to full diet, up in chair, and ambulating to BR. Oral pain regimen being established.      Barriers to Discharge: none    Plan: Will meet with pt for discharge planning assessment.

## 2018-11-01 NOTE — CARE PLAN
Problem: Pain Management  Goal: Pain level will decrease to patient's comfort goal    Intervention: Follow pain managment plan developed in collaboration with patient and Interdisciplinary Team  Prn meds per MAR, will try oral meds      Problem: Respiratory:  Goal: Respiratory status will improve    Intervention: Administer and titrate oxygen therapy  Wean off O2, encourage use of IS

## 2018-11-01 NOTE — PROGRESS NOTES
POD#3 doing well. Tolerating clears. No N/V   VSS afebrile   Skin warm  Abdomen incision looks good Soft ND NT JUSTO drain   Ext + SCD No C/C/E NT    Path Stage IIB grade 1 endometrioid owen of the ovary.     Imp: POD #3 doing well. D/C gamboa ambulate S/P MPE/[place] optimal Stage II endometroid owen of the the left ovary. Discussed path and recommend adjuvant chemotherapy Taxol/Carbo q 3 weeks X 6 not candidate for [facility] clinical trial. Discussed prognosis.   2.  Anemia asymptomatic. Will replace with oral iron therapy once bowel function is fully return.   3. DVT/PE: S/P IVC filter on lovenox per pulmonary will convert to oral AC. IVC filter placed and remove after chemotherapy and will treat with AC for 9 months.   Plan hopefully d/c home next few days.

## 2018-11-01 NOTE — PROGRESS NOTES
Eating better, no nausea with breakfast, oral pain meds x1 pill seems to wear off right away, will try 2 pills on next dose.

## 2018-11-01 NOTE — DIETARY
Nutrition Services: Weekly Rescreen  Pt is currently on a regular diet, which was advanced last night from a clear liquid diet.  Per chart, pt noted with variable intake ranging from <25%-100%.  Per MD note, pt presented with chronic lower abdominal pain and this morning the pain has gotten acutely worse - likely will affect PO intake.  RD will continue to monitor closely to ensure pt is receiving adequate nutrition.     Recommendations/Plan:  1. Encourage intake of meals and snacks.  2. Document intake of all meals and snacks as % taken in ADL's to provide interdisciplinary communication across all shifts.   3. Monitor weight; Please obtain new wt as feasible.  4. Nutrition rep will continue to see patient for ongoing meal and snack preferences.  5. Obtain supplement order per RD as needed.    RD following

## 2018-11-01 NOTE — PROGRESS NOTES
Received report from day RN, assumed care of PT at 1900. PT A&Ox 4, VSS, gamboa removed, , pt just got out of the shower, no unmet needs at this time, discussed plan of care for this shift.  Pain managed with meds per MAR. PT up SBA, safety precautions in place. Call light and personal possessions within reach.

## 2018-11-01 NOTE — PROGRESS NOTES
Hospital Medicine Daily Progress Note    Date of Service  2018    Chief Complaint  58 y.o. female admitted 10/24/2018 with history of hypothyroidism, chronic back pain, , epidural injection to L5-S1 2 weeks ago, who presented 10/24/2018 with chronic lower abdominal pain.  Follow-up MRI after the epidural injection demonstrated left 7.7 cm ovarian cyst.  Patient abdominal pain started about in 2018 and has been getting progressively worse it is sharp and dull, constant, worsening with movements,.  This morning pain is gotten acutely worse.  Patient was taken multiple doses of pain medication prescribed for back pain.  Ultrasound in the emergency department shows complex 11 x 11 x 7 cm heterogenous mass in the pelvis with small amount of free fluid.  Ultrasound of lower extremities showed DVT and patient was started on heparin.    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      Interval Problem Update  Patient states she is feeling better. Abdominal pain is improving and patient is requesting to be transitioned to PO pain meds. Tolerating diet.    Consultants/Specialty  Gyn    Code Status  FULL    Disposition  Home    Review of Systems  Review of Systems   Constitutional: Negative.    HENT: Negative.    Eyes: Negative.    Respiratory: Negative.    Cardiovascular: Negative.    Gastrointestinal: Positive for abdominal pain.   Musculoskeletal: Negative.    Neurological: Negative.    All other systems reviewed and are negative.       Physical Exam  Temp:  [36.9 °C (98.4 °F)-37.4 °C (99.3 °F)] 36.9 °C (98.4 °F)  Pulse:  [81-85] 85  Resp:  [16] 16  BP: ()/(56-68) 114/68    Physical Exam    Constitutional: She is oriented to person, place, and time. No distress.   HENT:   Head: Normocephalic and atraumatic.   Eyes: Pupils are equal, round, and reactive to light.   Neck: Normal range of motion. Neck supple.   Cardiovascular: Normal rate, regular rhythm and normal heart sounds.    Pulmonary/Chest: Effort normal and breath sounds normal.   Abdominal: Soft. Bowel sounds are normal.   Musculoskeletal: Normal range of motion.   Neurological: She is alert and oriented to person, place, and time.   Skin: She is not diaphoretic.   Vitals reviewed.      Fluids    Intake/Output Summary (Last 24 hours) at 11/01/18 0803  Last data filed at 11/01/18 0600   Gross per 24 hour   Intake              720 ml   Output             3115 ml   Net            -2395 ml       Laboratory  Recent Labs      10/30/18   0557  10/31/18   0017  11/01/18   0015   WBC  5.3  5.4  4.3*   RBC  2.73*  2.57*  2.23*   HEMOGLOBIN  8.8*  8.1*  7.1*   HEMATOCRIT  26.2*  24.5*  21.8*   MCV  96.0  95.3  97.8   MCH  32.2  31.5  31.8   MCHC  33.6  33.1*  32.6*   RDW  40.3  39.7  40.5   PLATELETCT  183  181  182   MPV  9.9  9.8  9.7     Recent Labs      10/30/18   0557  10/31/18   0017  11/01/18   0015   SODIUM  134*  129*  135   POTASSIUM  4.4  4.8  4.3   CHLORIDE  104  101  104   CO2  21  23  26   GLUCOSE  114*  128*  122*   BUN  8  6*  5*   CREATININE  0.47*  0.56  0.49*   CALCIUM  8.0*  8.0*  7.9*                     Assessment/Plan  Pelvic mass   Assessment & Plan    - s/p  Laparoscopy...  Exploratory laparotomy with ERNST-BSO with resection of left parametrial tissue and left ureterolysis  - Ultrasound showed:1.  Large left adnexal mass is suspicious for neoplasm. Coexisting ovarian torsion cannot be excluded in the appropriate clinical setting. MRI of the pelvis using ovarian mass protocol may be helpful for further evaluation, if clinically indicated.  2.  Nonvisualization of the right ovary  3.  Small amount of free pelvic fluid.  - CT of the  abd and pelvis showed:1. A 10.6 cm cystic and solid left adnexal mass, highly concerning for malignancy.  2. Small volume ascites.  3. Tiny hypodensity in the right hepatic lobe, too small to characterize, likely simple cyst.  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  - Gyn following (10/31):  Discussed path and recommend adjuvant chemotherapy Taxol/Carbo q 3 weeks X 6 not candidate for GO clinical trial. Discussed prognosis.         DVT (deep venous thrombosis) (HCC)   Assessment & Plan    - S/p ivc filter placement (10/27)  - Continue Lovenox BID  - Gyn following (10/31):  IVC filter placed and remove after chemotherapy and will treat with AC for 9 months.        Hypothyroidism- (present on admission)   Assessment & Plan    Continue home dose of levothyroxine.  TSH normal at 1.65  Follow-up with PCP             VTE prophylaxis: Lovenox

## 2018-11-01 NOTE — PROGRESS NOTES
Aaox4, abdominal incision LEAH, JUSTO drain dressing with some drainage noted, up sba to BR, POc discussed, will try oral pain meds, wean off O2, encouraged use of IS, needs attended.

## 2018-11-02 VITALS
OXYGEN SATURATION: 91 % | WEIGHT: 132.06 LBS | DIASTOLIC BLOOD PRESSURE: 58 MMHG | HEIGHT: 65 IN | RESPIRATION RATE: 16 BRPM | SYSTOLIC BLOOD PRESSURE: 97 MMHG | HEART RATE: 72 BPM | TEMPERATURE: 98.1 F | BODY MASS INDEX: 22 KG/M2

## 2018-11-02 LAB
APTT PPP: 48.5 SEC (ref 24.7–36)
BASOPHILS # BLD AUTO: 0.4 % (ref 0–1.8)
BASOPHILS # BLD: 0.02 K/UL (ref 0–0.12)
EOSINOPHIL # BLD AUTO: 0.59 K/UL (ref 0–0.51)
EOSINOPHIL NFR BLD: 10.3 % (ref 0–6.9)
ERYTHROCYTE [DISTWIDTH] IN BLOOD BY AUTOMATED COUNT: 40.4 FL (ref 35.9–50)
HCT VFR BLD AUTO: 21.8 % (ref 37–47)
HGB BLD-MCNC: 7.1 G/DL (ref 12–16)
IMM GRANULOCYTES # BLD AUTO: 0.03 K/UL (ref 0–0.11)
IMM GRANULOCYTES NFR BLD AUTO: 0.5 % (ref 0–0.9)
LYMPHOCYTES # BLD AUTO: 1.5 K/UL (ref 1–4.8)
LYMPHOCYTES NFR BLD: 26.3 % (ref 22–41)
MCH RBC QN AUTO: 31.6 PG (ref 27–33)
MCHC RBC AUTO-ENTMCNC: 32.6 G/DL (ref 33.6–35)
MCV RBC AUTO: 96.9 FL (ref 81.4–97.8)
MONOCYTES # BLD AUTO: 0.46 K/UL (ref 0–0.85)
MONOCYTES NFR BLD AUTO: 8.1 % (ref 0–13.4)
NEUTROPHILS # BLD AUTO: 3.11 K/UL (ref 2–7.15)
NEUTROPHILS NFR BLD: 54.4 % (ref 44–72)
NRBC # BLD AUTO: 0 K/UL
NRBC BLD-RTO: 0 /100 WBC
PLATELET # BLD AUTO: 222 K/UL (ref 164–446)
PMV BLD AUTO: 9.6 FL (ref 9–12.9)
RBC # BLD AUTO: 2.25 M/UL (ref 4.2–5.4)
WBC # BLD AUTO: 5.7 K/UL (ref 4.8–10.8)

## 2018-11-02 PROCEDURE — 700102 HCHG RX REV CODE 250 W/ 637 OVERRIDE(OP): Performed by: INTERNAL MEDICINE

## 2018-11-02 PROCEDURE — 85025 COMPLETE CBC W/AUTO DIFF WBC: CPT

## 2018-11-02 PROCEDURE — 700102 HCHG RX REV CODE 250 W/ 637 OVERRIDE(OP): Performed by: SPECIALIST

## 2018-11-02 PROCEDURE — 700111 HCHG RX REV CODE 636 W/ 250 OVERRIDE (IP): Performed by: SPECIALIST

## 2018-11-02 PROCEDURE — 99239 HOSP IP/OBS DSCHRG MGMT >30: CPT | Performed by: FAMILY MEDICINE

## 2018-11-02 PROCEDURE — A9270 NON-COVERED ITEM OR SERVICE: HCPCS | Performed by: SPECIALIST

## 2018-11-02 PROCEDURE — A9270 NON-COVERED ITEM OR SERVICE: HCPCS | Performed by: INTERNAL MEDICINE

## 2018-11-02 PROCEDURE — 36415 COLL VENOUS BLD VENIPUNCTURE: CPT

## 2018-11-02 PROCEDURE — 85730 THROMBOPLASTIN TIME PARTIAL: CPT

## 2018-11-02 RX ORDER — WARFARIN SODIUM 5 MG/1
5 TABLET ORAL DAILY
Qty: 30 TAB | Refills: 3 | Status: SHIPPED | OUTPATIENT
Start: 2018-11-02

## 2018-11-02 RX ORDER — OXYCODONE AND ACETAMINOPHEN 7.5; 325 MG/1; MG/1
1 TABLET ORAL EVERY 6 HOURS PRN
Qty: 20 TAB | Refills: 0 | Status: SHIPPED | OUTPATIENT
Start: 2018-11-02 | End: 2018-11-07

## 2018-11-02 RX ADMIN — LEVOTHYROXINE SODIUM 25 MCG: 25 TABLET ORAL at 05:13

## 2018-11-02 RX ADMIN — LORATADINE 10 MG: 10 TABLET ORAL at 05:12

## 2018-11-02 RX ADMIN — OXYCODONE HYDROCHLORIDE AND ACETAMINOPHEN 2 TABLET: 7.5; 325 TABLET ORAL at 10:00

## 2018-11-02 RX ADMIN — OXYCODONE HYDROCHLORIDE AND ACETAMINOPHEN 2 TABLET: 7.5; 325 TABLET ORAL at 04:06

## 2018-11-02 RX ADMIN — ENOXAPARIN SODIUM 60 MG: 100 INJECTION SUBCUTANEOUS at 05:13

## 2018-11-02 ASSESSMENT — PATIENT HEALTH QUESTIONNAIRE - PHQ9
1. LITTLE INTEREST OR PLEASURE IN DOING THINGS: NOT AT ALL
SUM OF ALL RESPONSES TO PHQ9 QUESTIONS 1 AND 2: 0

## 2018-11-02 ASSESSMENT — ENCOUNTER SYMPTOMS
EYES NEGATIVE: 1
RESPIRATORY NEGATIVE: 1
CARDIOVASCULAR NEGATIVE: 1
GASTROINTESTINAL NEGATIVE: 1
MUSCULOSKELETAL NEGATIVE: 1
CONSTITUTIONAL NEGATIVE: 1
NEUROLOGICAL NEGATIVE: 1

## 2018-11-02 ASSESSMENT — PAIN SCALES - GENERAL
PAINLEVEL_OUTOF10: 0
PAINLEVEL_OUTOF10: 5

## 2018-11-02 NOTE — PROGRESS NOTES
POD#5 doing well. Tolerating clears. No N/V   VSS afebrile   Skin warm  Abdomen incision looks good Soft ND NT JUSTO drain   Ext + SCD No C/C/E NT     Path Stage IIB grade 1 endometrioid owen of the ovary.      Imp: POD #5 doing well. D/C gamboa ambulate S/P MPE/[place] optimal Stage II endometroid owen of the the left ovary. Discussed path by Dr. Martinez and he recommended adjuvant chemotherapy Taxol/Carbo q 3 weeks X 6 not candidate for [facility] clinical trial. Discussed prognosis   2.  Anemia asymptomatic. Will replace with oral iron therapy once bowel function is fully return.   3. DVT/PE: S/P IVC filter on lovenox per pulmonary will convert to oral AC. IVC filter placed and remove after chemotherapy and will treat with AC for 9 months.  Started on COumadin 7.5 mg tonight. Rx for 2.5 mg given to pt and INR standing order per Dr. Martinez    Case d/w Dr. aMrtinez

## 2018-11-02 NOTE — DISCHARGE INSTRUCTIONS
Discharge Instructions    Discharged to home by car with relative. Discharged via wheelchair, hospital escort: Yes.  Special equipment needed: Not Applicable    Be sure to schedule a follow-up appointment with your primary care doctor or any specialists as instructed.     Discharge Plan:   Influenza Vaccine Indication: Patient Refuses    I understand that a diet low in cholesterol, fat, and sodium is recommended for good health. Unless I have been given specific instructions below for another diet, I accept this instruction as my diet prescription.   Other diet: as tolerated    Special Instructions:   Follow up with Dr. Martinez as instructed and to call for appointment  No heavy lifting and sexual intercourse until cleared by MD Granados to shower, pat incision dry  Watch for any signs of bleeding while taking blood thinners  Watch for any signs of drainage form incision site    · Is patient discharged on Warfarin / Coumadin?   Yes    You are receiving the drug warfarin. Please understand the importance of monitoring warfarin with scheduled PT/INR blood draws.  Follow-up with the Dr. Duggan [facility] in one week for INR lab results    IMPORTANT: HOW TO USE THIS INFORMATION:  This is a summary and does NOT have all possible information about this product. This information does not assure that this product is safe, effective, or appropriate for you. This information is not individual medical advice and does not substitute for the advice of your health care professional. Always ask your health care professional for complete information about this product and your specific health needs.      WARFARIN - ORAL (WARF-uh-rin)      COMMON BRAND NAME(S): Coumadin      WARNING:  Warfarin can cause very serious (possibly fatal) bleeding. This is more likely to occur when you first start taking this medication or if you take too much warfarin. To decrease your risk for bleeding, your doctor or other health care provider will monitor you  "closely and check your lab results (INR test) to make sure you are not taking too much warfarin. Keep all medical and laboratory appointments. Tell your doctor right away if you notice any signs of serious bleeding. See also Side Effects section.      USES:  This medication is used to treat blood clots (such as in deep vein thrombosis-DVT or pulmonary embolus-PE) and/or to prevent new clots from forming in your body. Preventing harmful blood clots helps to reduce the risk of a stroke or heart attack. Conditions that increase your risk of developing blood clots include a certain type of irregular heart rhythm (atrial fibrillation), heart valve replacement, recent heart attack, and certain surgeries (such as hip/knee replacement). Warfarin is commonly called a \"blood thinner,\" but the more correct term is \"anticoagulant.\" It helps to keep blood flowing smoothly in your body by decreasing the amount of certain substances (clotting proteins) in your blood.      HOW TO USE:  Read the Medication Guide provided by your pharmacist before you start taking warfarin and each time you get a refill. If you have any questions, ask your doctor or pharmacist. Take this medication by mouth with or without food as directed by your doctor or other health care professional, usually once a day. It is very important to take it exactly as directed. Do not increase the dose, take it more frequently, or stop using it unless directed by your doctor. Dosage is based on your medical condition, laboratory tests (such as INR), and response to treatment. Your doctor or other health care provider will monitor you closely while you are taking this medication to determine the right dose for you. Use this medication regularly to get the most benefit from it. To help you remember, take it at the same time each day. It is important to eat a balanced, consistent diet while taking warfarin. Some foods can affect how warfarin works in your body and may " affect your treatment and dose. Avoid sudden large increases or decreases in your intake of foods high in vitamin K (such as broccoli, cauliflower, cabbage, brussels sprouts, kale, spinach, and other green leafy vegetables, liver, green tea, certain vitamin supplements). If you are trying to lose weight, check with your doctor before you try to go on a diet. Cranberry products may also affect how your warfarin works. Limit the amount of cranberry juice (16 ounces/480 milliliters a day) or other cranberry products you may drink or eat.      SIDE EFFECTS:  Nausea, loss of appetite, or stomach/abdominal pain may occur. If any of these effects persist or worsen, tell your doctor or pharmacist promptly. Remember that your doctor has prescribed this medication because he or she has judged that the benefit to you is greater than the risk of side effects. Many people using this medication do not have serious side effects. This medication can cause serious bleeding if it affects your blood clotting proteins too much (shown by unusually high INR lab results). Even if your doctor stops your medication, this risk of bleeding can continue for up to a week. Tell your doctor right away if you have any signs of serious bleeding, including: unusual pain/swelling/discomfort, unusual/easy bruising, prolonged bleeding from cuts or gums, persistent/frequent nosebleeds, unusually heavy/prolonged menstrual flow, pink/dark urine, coughing up blood, vomit that is bloody or looks like coffee grounds, severe headache, dizziness/fainting, unusual or persistent tiredness/weakness, bloody/black/tarry stools, chest pain, shortness of breath, difficulty swallowing. Tell your doctor right away if any of these unlikely but serious side effects occur: persistent nausea/vomiting, severe stomach/abdominal pain, yellowing eyes/skin. This drug rarely has caused very serious (possibly fatal) problems if its effects lead to small blood clots (usually at  the beginning of treatment). This can lead to severe skin/tissue damage that may require surgery or amputation if left untreated. Patients with certain blood conditions (protein C or S deficiency) may be at greater risk. Get medical help right away if any of these rare but serious side effects occur: painful/red/purplish patches on the skin (such as on the toe, breast, abdomen), change in the amount of urine, vision changes, confusion, slurred speech, weakness on one side of the body. A very serious allergic reaction to this drug is rare. However, get medical help right away if you notice any symptoms of a serious allergic reaction, including: rash, itching/swelling (especially of the face/tongue/throat), severe dizziness, trouble breathing. This is not a complete list of possible side effects. If you notice other effects not listed above, contact your doctor or pharmacist. In the [place] - Call your doctor for medical advice about side effects. You may report side effects to FDA at 2-047-TMQ-4531. In [place] - Call your doctor for medical advice about side effects. You may report side effects to [facility] [place] at 1-595.920.2087.      PRECAUTIONS:  Before taking warfarin, tell your doctor or pharmacist if you are allergic to it; or if you have any other allergies. This product may contain inactive ingredients, which can cause allergic reactions or other problems. Talk to your pharmacist for more details. Before using this medication, tell your doctor or pharmacist your medical history, especially of: blood disorders (such as anemia, hemophilia), bleeding problems (such as bleeding of the stomach/intestines, bleeding in the brain), blood vessel disorders (such as aneurysms), recent major injury/surgery, liver disease, alcohol use, mental/mood disorders (including memory problems), frequent falls/injuries. It is important that all your doctors and dentists know that you take warfarin. Before having surgery or any  medical/dental procedures, tell your doctor or dentist that you are taking this medication and about all the products you use (including prescription drugs, nonprescription drugs, and herbal products). Avoid getting injections into the muscles. If you must have an injection into a muscle (for example, a flu shot), it should be given in the arm. This way, it will be easier to check for bleeding and/or apply pressure bandages. This medication may cause stomach bleeding. Daily use of alcohol while using this medicine will increase your risk for stomach bleeding and may also affect how this medication works. Limit or avoid alcoholic beverages. If you have not been eating well, if you have an illness or infection that causes fever, vomiting, or diarrhea for more than 2 days, or if you start using any antibiotic medications, contact your doctor or pharmacist immediately because these conditions can affect how warfarin works. This medication can cause heavy bleeding. To lower the chance of getting cut, bruised, or injured, use great caution with sharp objects like safety razors and nail cutters. Use an electric razor when shaving and a soft toothbrush when brushing your teeth. Avoid activities such as contact sports. If you fall or injure yourself, especially if you hit your head, call your doctor immediately. Your doctor may need to check you. The Food & Drug Administration has stated that generic warfarin products are interchangeable. However, consult your doctor or pharmacist before switching warfarin products. Be careful not to take more than one medication that contains warfarin unless specifically directed by the doctor or health care provider who is monitoring your warfarin treatment. Older adults may be at greater risk for bleeding while using this drug. This medication is not recommended for use during pregnancy because of serious (possibly fatal) harm to an unborn baby. Discuss the use of reliable forms of birth  "control with your doctor. If you become pregnant or think you may be pregnant, tell your doctor immediately. If you are planning pregnancy, discuss a plan for managing your condition with your doctor before you become pregnant. Your doctor may switch the type of medication you use during pregnancy. Very small amounts of this medication may pass into breast milk but is unlikely to harm a nursing infant. Consult your doctor before breast-feeding.      DRUG INTERACTIONS:  Drug interactions may change how your medications work or increase your risk for serious side effects. This document does not contain all possible drug interactions. Keep a list of all the products you use (including prescription/nonprescription drugs and herbal products) and share it with your doctor and pharmacist. Do not start, stop, or change the dosage of any medicines without your doctor's approval. Warfarin interacts with many prescription, nonprescription, vitamin, and herbal products. This includes medications that are applied to the skin or inside the vagina or rectum. The interactions with warfarin usually result in an increase or decrease in the \"blood-thinning\" (anticoagulant) effect. Your doctor or other health care professional should closely monitor you to prevent serious bleeding or clotting problems. While taking warfarin, it is very important to tell your doctor or pharmacist of any changes in medications, vitamins, or herbal products that you are taking. Some products that may interact with this drug include: capecitabine, imatinib, mifepristone. Aspirin, aspirin-like drugs (salicylates), and nonsteroidal anti-inflammatory drugs (NSAIDs such as ibuprofen, naproxen, celecoxib) may have effects similar to warfarin. These drugs may increase the risk of bleeding problems if taken during treatment with warfarin. Carefully check all prescription/nonprescription product labels (including drugs applied to the skin such as pain-relieving " creams) since the products may contain NSAIDs or salicylates. Talk to your doctor about using a different medication (such as acetaminophen) to treat pain/fever. Low-dose aspirin and related drugs (such as clopidogrel, ticlopidine) should be continued if prescribed by your doctor for specific medical reasons such as heart attack or stroke prevention. Consult your doctor or pharmacist for more details. Many herbal products interact with warfarin. Tell your doctor before taking any herbal products, especially bromelains, coenzyme Q10, cranberry, danshen, dong quai, fenugreek, garlic, ginkgo biloba, ginseng, and [place]'s wort, among others. This medication may interfere with a certain laboratory test to measure theophylline levels, possibly causing false test results. Make sure laboratory personnel and all your doctors know you use this drug.      OVERDOSE:  If overdose is suspected, contact a poison control center or emergency room immediately.  residents can call the [place] Poison Hotline at 1-926.567.3578. [place] residents can call a provincial poison control center. Symptoms of overdose may include: bloody/black/tarry stools, pink/dark urine, unusual/prolonged bleeding.      NOTES:  Do not share this medication with others. Laboratory and/or medical tests (such as INR, complete blood count) must be performed periodically to monitor your progress or check for side effects. Consult your doctor for more details.      MISSED DOSE:  For the best possible benefit, do not miss any doses. If you do miss a dose and remember on the same day, take it as soon as you remember. If you remember on the next day, skip the missed dose and resume your usual dosing schedule. Do not double the dose to catch up because this could increase your risk for bleeding. Keep a record of missed doses to give to your doctor or pharmacist. Contact your doctor or pharmacist if you miss 2 or more doses in a row.      STORAGE:  Store at room  temperature away from light and moisture. Do not store in the bathroom. Keep all medications away from children and pets. Do not flush medications down the toilet or pour them into a drain unless instructed to do so. Properly discard this product when it is  or no longer needed. Consult your pharmacist or local waste disposal company for more details about how to safely discard your product.      MEDICAL ALERT:  Your condition and medication can cause complications in a medical emergency. For information about enrolling in MedicAlert, call 1-518.122.3200 ([place]) or 1-192.221.7331 ([place]).      Information last revised 2010 Copyright(c) 2010 [employer]             Depression / Suicide Risk    As you are discharged from this [facility] facility, it is important to learn how to keep safe from harming yourself.    Recognize the warning signs:  · Abrupt changes in personality, positive or negative- including increase in energy   · Giving away possessions  · Change in eating patterns- significant weight changes-  positive or negative  · Change in sleeping patterns- unable to sleep or sleeping all the time   · Unwillingness or inability to communicate  · Depression  · Unusual sadness, discouragement and loneliness  · Talk of wanting to die  · Neglect of personal appearance   · Rebelliousness- reckless behavior  · Withdrawal from people/activities they love  · Confusion- inability to concentrate     If you or a loved one observes any of these behaviors or has concerns about self-harm, here's what you can do:  · Talk about it- your feelings and reasons for harming yourself  · Remove any means that you might use to hurt yourself (examples: pills, rope, extension cords, firearm)  · Get professional help from the community (Mental Health, Substance Abuse, psychological counseling)  · Do not be alone:Call your Safe Contact- someone whom you trust who will be there for you.  · Call your local CRISIS  HOTLINE 532-5823 or 124-477-0151  · Call your local Children's Mobile Crisis Response Team [place] (288) 559-8663 or www.PostBeyond  · Call the toll free National Suicide Prevention Hotlines   · National Suicide Prevention Lifeline 219-667-GFBQ (0155)  · National [employer] Line Network 800-SUICIDE (693-1356)

## 2018-11-02 NOTE — CARE PLAN
Problem: Pain Management  Goal: Pain level will decrease to patient's comfort goal    Intervention: Follow pain managment plan developed in collaboration with patient and Interdisciplinary Team  Patient educated and encouraged to manage pain as appropriate. PO pain medication given when needed, ambulation and repositioning encouraged. Education provided to patient to self regulate and pain and communicate as appropriate.       Problem: Infection  Goal: Will remain free from infection    Intervention: Assess signs and symptoms of infection  Patient continues to be assessed Q shift and PRN at incision site for s/sx of infection. Educated patient on monitoring incision site and drainage for color, odor, and amount. Cooperative with all care. VS continue as ordered. Encouraged patient to monitor temperature along with incision site while at home.       Problem: Bowel/Gastric:  Goal: Normal bowel function is maintained or improved    Intervention: Educate patient and significant other/support system about diet, fluid intake, medications and activity to promote bowel function  Patient educated on diet and fluid intake. Patient also educated on bowel function r/t narcotic use and patient understands and is cooperative with all care provided.       Problem: Discharge Barriers/Planning  Goal: Patient's continuum of care needs will be met    Intervention: Assess potential discharge barriers on admission and throughout hospital stay  Educated patient on possible DC for 11/2/18. Coumadin given at HS. Drain removed. Labs to be drawn in AM. Educated patient on plans for DC.

## 2018-11-02 NOTE — PROGRESS NOTES
Hospital Medicine Daily Progress Note    Date of Service  2018    Chief Complaint  58 y.o. female admitted 10/24/2018 with history of hypothyroidism, chronic back pain, , epidural injection to L5-S1 2 weeks ago, who presented 10/24/2018 with chronic lower abdominal pain.  Follow-up MRI after the epidural injection demonstrated left 7.7 cm ovarian cyst.  Patient abdominal pain started about in 2018 and has been getting progressively worse it is sharp and dull, constant, worsening with movements,.  This morning pain is gotten acutely worse.  Patient was taken multiple doses of pain medication prescribed for back pain.  Ultrasound in the emergency department shows complex 11 x 11 x 7 cm heterogenous mass in the pelvis with small amount of free fluid.  Ultrasound of lower extremities showed DVT and patient was started on heparin.    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        Interval Problem Update  Patient feeling better. Requesting to go home today. Will be discharged on Loveox/Coumadin and Percocet.    Consultants/Specialty  Gyn    Code Status  FULL    Disposition  Home    Review of Systems  Review of Systems   Constitutional: Negative.    HENT: Negative.    Eyes: Negative.    Respiratory: Negative.    Cardiovascular: Negative.    Gastrointestinal: Negative.    Musculoskeletal: Negative.    Neurological: Negative.    All other systems reviewed and are negative.       Physical Exam  Temp:  [36.5 °C (97.7 °F)-37.1 °C (98.8 °F)] 37.1 °C (98.8 °F)  Pulse:  [60-85] 85  Resp:  [16] 16  BP: ()/(59-76) 110/76    Physical Exam   Constitutional: She is oriented to person, place, and time.  No distress.   HENT:   Head: Normocephalic and atraumatic.   Eyes: Pupils are equal, round, and reactive to light.   Neck: Normal range of motion. Neck supple.   Cardiovascular: Normal rate, regular rhythm and normal heart sounds.    Pulmonary/Chest: Effort normal and breath sounds normal.   Abdominal: Soft. Bowel sounds are normal.   Musculoskeletal: Normal range of motion.   Neurological: She is alert and oriented to person, place, and time.   Skin: She is not diaphoretic.   Vitals reviewed.      Fluids    Intake/Output Summary (Last 24 hours) at 11/02/18 0739  Last data filed at 11/02/18 0600   Gross per 24 hour   Intake              600 ml   Output              450 ml   Net              150 ml       Laboratory  Recent Labs      10/31/18   0017  11/01/18   0015  11/02/18   0025   WBC  5.4  4.3*  5.7   RBC  2.57*  2.23*  2.25*   HEMOGLOBIN  8.1*  7.1*  7.1*   HEMATOCRIT  24.5*  21.8*  21.8*   MCV  95.3  97.8  96.9   MCH  31.5  31.8  31.6   MCHC  33.1*  32.6*  32.6*   RDW  39.7  40.5  40.4   PLATELETCT  181  182  222   MPV  9.8  9.7  9.6     Recent Labs      10/31/18   0017  11/01/18   0015   SODIUM  129*  135   POTASSIUM  4.8  4.3   CHLORIDE  101  104   CO2  23  26   GLUCOSE  128*  122*   BUN  6*  5*   CREATININE  0.56  0.49*   CALCIUM  8.0*  7.9*     Recent Labs      11/02/18   0025   APTT  48.5*                 Assessment/Plan  Pelvic mass   Assessment & Plan    - s/p  Laparoscopy...  Exploratory laparotomy with ERNST-BSO with resection of left parametrial tissue and left ureterolysis  - Now POD#4  - Ultrasound showed:1.  Large left adnexal mass is suspicious for neoplasm. Coexisting ovarian torsion cannot be excluded in the appropriate clinical setting. MRI of the pelvis using ovarian mass protocol may be helpful for further evaluation, if clinically indicated.  2.  Nonvisualization of the right ovary  3.  Small amount of free pelvic fluid.  - CT of the abd and pelvis showed:1. A 10.6 cm cystic and solid left  adnexal mass, highly concerning for malignancy.  2. Small volume ascites.  3. Tiny hypodensity in the right hepatic lobe, too small to characterize, likely simple cyst.  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  - Gyn following (10/31):  Discussed path and recommend adjuvant chemotherapy Taxol/Carbo q 3 weeks X 6 not candidate for [facility] [place] trial. Discussed prognosis.         DVT (deep venous thrombosis) (HCC)   Assessment & Plan    - S/p ivc filter placement (10/27)  - Continue Lovenox BID  - Gyn following (10/31):  IVC filter placed and remove after chemotherapy and will treat with AC for 9 months.  - Continue Lovenox on discharge and start Warfarin        Hypothyroidism- (present on admission)   Assessment & Plan    Stable  Continue home dose of levothyroxine.  TSH normal at 1.65  Follow-up with PCP             VTE prophylaxis: Lovenox

## 2018-11-02 NOTE — DISCHARGE SUMMARY
Discharge Summary    CHIEF COMPLAINT ON ADMISSION  Chief Complaint   Patient presents with   • Abdominal Pain     LEFT SIDED ABD PAIN HST OF RECENT OVARIAN CYST INCREASED PAIN TODAY        Reason for Admission  Abdominal pain     Admission Date  10/24/2018    CODE STATUS  Full Code    HPI & HOSPITAL COURSE  This is a 58 y.o. female here with history of hypothyroidism, chronic back pain, , epidural injection to L5-S1 2 weeks ago, who presented 10/24/2018 with chronic lower abdominal pain.  Follow-up MRI after the epidural injection demonstrated left 7.7 cm ovarian cyst.  Patient abdominal pain started about in 2018 and has been getting progressively worse it is sharp and dull, constant, worsening with movements,.  This morning pain is gotten acutely worse.  Patient was taken multiple doses of pain medication prescribed for back pain.  Ultrasound in the emergency department shows complex 11 x 11 x 7 cm heterogenous mass in the pelvis with small amount of free fluid.  Ultrasound of lower extremities showed DVT and patient was started on heparin. 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    Patient is now doing well and is requesting to go home    Therefore, she is discharged in good and stable condition to home with close outpatient follow-up.    The patient met 2-midnight criteria for an inpatient stay at the time of discharge.    Discharge Date  18    FOLLOW UP ITEMS POST DISCHARGE  Pelvic mass    DISCHARGE DIAGNOSES  Active Problems:    Pelvic mass POA: Unknown    Hypothyroidism POA: Yes    DVT (deep venous thrombosis) (HCC) POA: Unknown  Resolved Problems:     Acute respiratory failure (HCC) POA: Unknown      FOLLOW UP  No future appointments.  No follow-up provider specified.    MEDICATIONS ON DISCHARGE     Medication List      START taking these medications      Instructions   enoxaparin 60 MG/0.6ML Soln inj  Commonly known as:  LOVENOX   Inject 60 mg as instructed every 12 hours for 5 days.  Dose:  60 mg     oxyCODONE-acetaminophen 7.5-325 MG per tablet  Commonly known as:  PERCOCET   Take 1 Tab by mouth every 6 hours as needed for Moderate Pain or Severe Pain for up to 5 days.  Dose:  1 Tab     warfarin 5 MG Tabs  Commonly known as:  COUMADIN   Take 1 Tab by mouth every day.  Dose:  5 mg        CONTINUE taking these medications      Instructions   acetaminophen 325 MG Tabs  Commonly known as:  TYLENOL   Take 650 mg by mouth every 6 hours as needed for Mild Pain.  Dose:  650 mg     cyanocobalamin 1000 MCG/ML Soln  Commonly known as:  VITAMIN B-12   1,000 mcg by Intramuscular route every 30 days.  Dose:  1000 mcg     FIBER PO   Take 2 Tabs by mouth 4 times a day.  Dose:  2 Tab     levothyroxine 25 MCG Tabs  Commonly known as:  SYNTHROID   Take 1 Tab by mouth Every morning on an empty stomach.  Dose:  25 mcg     loratadine 10 MG Tabs  Commonly known as:  CLARITIN   Take 10 mg by mouth every day.  Dose:  10 mg     multivitamin Tabs   Take 1 Tab by mouth every day.  Dose:  1 Tab        STOP taking these medications    tramadol 50 MG Tabs  Commonly known as:  ULTRAM            Allergies  Allergies   Allergen Reactions   • Fluconazole Swelling     Face Swelling       DIET  Orders Placed This Encounter   Procedures   • Diet Order Regular     Standing Status:   Standing     Number of Occurrences:   1     Order Specific Question:   Diet:     Answer:   Regular [1]       ACTIVITY  As tolerated.  Weight bearing as tolerated    CONSULTATIONS  Gyn    PROCEDURES  S/p IVC FILTER PLACEMENT (10/27)  S/p Laparoscopy with exploratory laparotomy with ERNST-BSO with resection of left  parametrial   tissue and left     LABORATORY  Lab Results   Component Value Date    SODIUM 135 11/01/2018    POTASSIUM 4.3 11/01/2018    CHLORIDE 104 11/01/2018    CO2 26 11/01/2018    GLUCOSE 122 (H) 11/01/2018    BUN 5 (L) 11/01/2018    CREATININE 0.49 (L) 11/01/2018        Lab Results   Component Value Date    WBC 5.7 11/02/2018    HEMOGLOBIN 7.1 (L) 11/02/2018    HEMATOCRIT 21.8 (L) 11/02/2018    PLATELETCT 222 11/02/2018        Total time of the discharge process exceeds 31 minutes.

## 2018-11-02 NOTE — PROGRESS NOTES
Seen by Dr. Malone, aaox4, pain controlled with oral meds, no NV,Dr. Malone made aware of oral coumadin orders from Dr. Martinez, will order dc plans as well, needs attended.

## 2018-11-02 NOTE — DISCHARGE PLANNING
Anticipated Discharge Disposition: Home    Action: Met with pt and  at bedside.  Pt is Aox4, very pleasant affect. Pt reports support from , no issues obtaining transportation and has rx coverage for needed rx.    Pt will be discharged with Rx for Lovenox, it was E[facility] to [facility] [address].  Contacted this pharmacy, s/w angelo Gonzalez copay for five day supply is $94.55.  Provided this information to pt, sts understanding, and can afford the copay.    Pt has questions surrounding self administration of Lovenox. Durga DIAZ, at bedside providing teaching.   Pt has standing order for INR monitoring.  NAYLA Joseph PAC, reviewed this information with pt prior to discharge.     Barriers to Discharge: none    Plan: Home with support of .     Care Transition Team Assessment    Information Source  Orientation : Oriented x 4  Information Given By: Patient, Spouse  Who is responsible for making decisions for patient? : Patient    Discharge Preparedness  What is your plan after discharge?: Home with help  What are your discharge supports?: Spouse  Prior Functional Level: Independent with Activities of Daily Living, Independent with Medication Management  Difficulity with ADLs: None  Difficulity with IADLs: None    Functional Assesment  Prior Functional Level: Independent with Activities of Daily Living, Independent with Medication Management    Finances  Financial Barriers to Discharge: No  Prescription Coverage: Yes    Vision / Hearing Impairment  Vision Impairment : No  Hearing Impairment : No     Discharge Risks or Barriers  Discharge risks or barriers?: No    Anticipated Discharge Information  Anticipated discharge disposition: Home  Discharge Address: [address] [place] [address]   Discharge Contact Phone Number: [phone]

## 2018-11-02 NOTE — PROGRESS NOTES
Able to demonstrate how to give lovenox SQ inj with  at bedside, print out form [facility] given regarding coumadin and lovenox.

## 2018-11-07 ENCOUNTER — TELEPHONE (OUTPATIENT)
Dept: OTHER | Facility: MEDICAL CENTER | Age: 58
End: 2018-11-07

## 2018-11-07 NOTE — TELEPHONE ENCOUNTER
Telephone call to pt to follow up after recent surgery.  No answer, left message requesting return call.

## 2018-12-07 ENCOUNTER — HOSPITAL ENCOUNTER (OUTPATIENT)
Dept: RADIOLOGY | Facility: MEDICAL CENTER | Age: 58
End: 2018-12-07
Attending: SPECIALIST
Payer: COMMERCIAL

## 2018-12-07 DIAGNOSIS — C56.9 MALIGNANT NEOPLASM OF OVARY, UNSPECIFIED LATERALITY (HCC): ICD-10-CM

## 2018-12-07 PROCEDURE — 700117 HCHG RX CONTRAST REV CODE 255: Performed by: SPECIALIST

## 2018-12-07 PROCEDURE — 71260 CT THORAX DX C+: CPT

## 2018-12-07 RX ADMIN — IOHEXOL 100 ML: 350 INJECTION, SOLUTION INTRAVENOUS at 08:30

## 2018-12-07 RX ADMIN — IOHEXOL 50 ML: 240 INJECTION, SOLUTION INTRATHECAL; INTRAVASCULAR; INTRAVENOUS; ORAL at 08:30

## 2018-12-18 NOTE — H&P
Hospital Medicine History and Physical    Date of Service  10/24/2018    Chief Complaint  Chief Complaint   Patient presents with   • Abdominal Pain     LEFT SIDED ABD PAIN HST OF RECENT OVARIAN CYST INCREASED PAIN TODAY        History of Presenting Illness  58 y.o. female with history of hypothyroidism, chronic back pain, , epidural injection to L5-S1 2 weeks ago, who presented 10/24/2018 with chronic lower abdominal pain.  Follow-up MRI after the epidural injection demonstrated left 7.7 cm ovarian cyst.  Patient abdominal pain started about in 2018 and has been getting progressively worse it is sharp and dull, constant, worsening with movements,.  This morning pain is gotten acutely worse.  Patient was taken multiple doses of pain medication prescribed for back pain.  Ultrasound in the emergency department shows complex 11 x 11 x 7 cm heterogenous mass in the pelvis with small amount of free fluid.  Ultrasound of lower extremities showed DVT and patient was started on heparin.  ERP discussed patient case with Dr. Martinez who will consult for pelvic mass.  Patient noted to be hypoxic in. emergency department while she never been on oxygen before, requiring 1-2 L of nasal cannula.  Denies chest pain, shortness of breath, cough  Primary Care Physician  Tawanna Alves M.D.    Consultants  Oncology/gynecology    Code Status  Code: Full code    Review of Systems  Review of Systems   Constitutional: Negative for chills, fever and weight loss.   HENT: Negative for ear pain, hearing loss and tinnitus.    Eyes: Negative for blurred vision, double vision and photophobia.   Respiratory: Negative for cough, hemoptysis and sputum production.    Cardiovascular: Negative for chest pain, palpitations and orthopnea.   Gastrointestinal: Positive for abdominal pain and constipation. Negative for blood in stool, diarrhea, melena, nausea and vomiting.   Genitourinary: Negative for dysuria, frequency and urgency.  We are not treating the patient for his triglycerides.  Therefore this would not be an acceptable alternative.  Please advised the patient that this is not being covered by his insurance.  We can try him on simvastatin alone and repeat his lab work in approximately 3-4 months.  Otherwise he will have to pay for the medication himself.  Please let me know what he wants to do.     Musculoskeletal: Positive for back pain. Negative for myalgias and neck pain.   Skin: Negative for itching and rash.   Neurological: Negative for tingling, tremors and headaches.   Endo/Heme/Allergies: Negative for environmental allergies and polydipsia. Does not bruise/bleed easily.   Psychiatric/Behavioral: Negative for depression, substance abuse and suicidal ideas.          Past Medical History  Past Medical History:   Diagnosis Date   • Hypothyroidism      Surgical History  Past Surgical History:   Procedure Laterality Date   • PRIMARY C SECTION      x 2   • SINUSCOPY         Medications  No current facility-administered medications on file prior to encounter.      Current Outpatient Prescriptions on File Prior to Encounter   Medication Sig Dispense Refill   • cyanocobalamin (VITAMIN B-12) 1000 MCG/ML Solution 1,000 mcg by Intramuscular route every 30 days.     • levothyroxine (SYNTHROID) 25 MCG Tab Take 1 Tab by mouth Every morning on an empty stomach. 90 Tab 1       Family History  Father had hypertension, dyslipidemia, pernicious anemia.  Mother has bipolar disorder.  Denies history of cancer    Social History  Social History   Substance Use Topics   • Smoking status: Never Smoker   • Smokeless tobacco: Never Used   • Alcohol use No       Allergies  Allergies   Allergen Reactions   • Fluconazole Swelling     Face Swelling        Physical Exam  Laboratory   Hemodynamics  Temp (24hrs), Av.8 °C (98.2 °F), Min:36.7 °C (98.1 °F), Max:36.8 °C (98.3 °F)   Temperature: 36.7 °C (98.1 °F)  Pulse  Av  Min: 71  Max: 93    Blood Pressure: 112/68      Respiratory      Respiration: 18, Pulse Oximetry: 95 %             Physical Exam      Assessment/Plan  Pelvic mass   Assessment & Plan     Ultrasound in the emergency department shows complex 11 x 11 x 7 cm heterogenous mass in the pelvis with small amount of free fluid.   Likely ovarian cancer  Dr. Martinez will consult  Pain control  Bowel regimen        Acute  respiratory failure (HCC)   Assessment & Plan    Possibly related to opiates given in the emergency department  Cannot rule out PE, given nearly diagnosed DVT  CTA of pulmonary artery ordered  Incentive spirometry  Supplemental oxygen as needed        DVT (deep venous thrombosis) (HCC)   Assessment & Plan    Started on IV heparin.  Await consultation of Dr. Martinez to decide on possibly IVC filter placement for safe surgery        Hypothyroidism- (present on admission)   Assessment & Plan    Continue home dose of levothyroxine.  Follow-up with PCP            I anticipate this patient will require at least two midnights for appropriate medical management, necessitating inpatient admission.    Prophylaxis: heparin drip    Recent Labs      10/24/18   1235   WBC  8.7   RBC  4.12*   HEMOGLOBIN  13.7   HEMATOCRIT  38.7   MCV  93.9   MCH  33.3*   MCHC  35.4*   RDW  41.4   PLATELETCT  215   MPV  9.0     Recent Labs      10/24/18   1235   SODIUM  137   POTASSIUM  4.1   CHLORIDE  98   CO2  26   GLUCOSE  130*   BUN  12   CREATININE  0.73   CALCIUM  10.3     Recent Labs      10/24/18   1235   ALTSGPT  12   ASTSGOT  19   ALKPHOSPHAT  88   TBILIRUBIN  0.5   LIPASE  5*   GLUCOSE  130*     Recent Labs      10/24/18   1235  10/24/18   2057   APTT   --   70.7*   INR  0.97   --              No results found for: TROPONINI    Imaging  US-PELVIC TRANSVAGINAL ONLY   Final Result      1.  Large left adnexal mass is suspicious for neoplasm. Coexisting ovarian torsion cannot be excluded in the appropriate clinical setting. MRI of the pelvis using ovarian mass protocol may be helpful for further evaluation, if clinically indicated.      2.  Nonvisualization of the right ovary      3.  Small amount of free pelvic fluid.         Comment: Results discussed with Dr. Reed at 4:10 PM      OUTSIDE IMAGES-MR PELVIS   Final Result      US-EXTREMITY VENOUS LOWER UNILAT LEFT   Final Result      DX-CHEST-LIMITED (1 VIEW)    (Results Pending)   CT-CTA  CHEST PULMONARY ARTERY W/ RECONS    (Results Pending)

## 2019-01-04 DIAGNOSIS — Z51.11 ENCOUNTER FOR ANTINEOPLASTIC CHEMOTHERAPY: ICD-10-CM

## 2019-01-04 DIAGNOSIS — C56.9 MALIGNANT NEOPLASM OF OVARY, UNSPECIFIED LATERALITY (HCC): ICD-10-CM

## 2019-01-04 RX ORDER — 0.9 % SODIUM CHLORIDE 0.9 %
VIAL (ML) INJECTION PRN
Status: CANCELLED | OUTPATIENT
Start: 2019-01-09

## 2019-01-04 RX ORDER — 0.9 % SODIUM CHLORIDE 0.9 %
5 VIAL (ML) INJECTION PRN
Status: CANCELLED | OUTPATIENT
Start: 2019-01-09

## 2019-01-04 RX ORDER — 0.9 % SODIUM CHLORIDE 0.9 %
5 VIAL (ML) INJECTION PRN
Status: CANCELLED | OUTPATIENT
Start: 2019-01-08

## 2019-01-04 RX ORDER — ONDANSETRON 8 MG/1
8 TABLET, ORALLY DISINTEGRATING ORAL
Status: CANCELLED | OUTPATIENT
Start: 2019-01-09

## 2019-01-04 RX ORDER — 0.9 % SODIUM CHLORIDE 0.9 %
VIAL (ML) INJECTION PRN
Status: CANCELLED | OUTPATIENT
Start: 2019-01-08

## 2019-01-04 RX ORDER — ONDANSETRON 2 MG/ML
4 INJECTION INTRAMUSCULAR; INTRAVENOUS
Status: CANCELLED | OUTPATIENT
Start: 2019-01-09

## 2019-01-04 RX ORDER — PROCHLORPERAZINE MALEATE 10 MG
10 TABLET ORAL EVERY 6 HOURS PRN
Status: CANCELLED | OUTPATIENT
Start: 2019-01-09

## 2019-01-04 RX ORDER — SODIUM CHLORIDE 9 MG/ML
INJECTION, SOLUTION INTRAVENOUS CONTINUOUS
Status: CANCELLED | OUTPATIENT
Start: 2019-01-09

## 2019-01-05 ENCOUNTER — APPOINTMENT (OUTPATIENT)
Dept: ONCOLOGY | Facility: MEDICAL CENTER | Age: 59
End: 2019-01-05
Attending: SPECIALIST
Payer: COMMERCIAL

## 2019-01-07 ENCOUNTER — OFFICE VISIT (OUTPATIENT)
Dept: HEMATOLOGY ONCOLOGY | Facility: MEDICAL CENTER | Age: 59
End: 2019-01-07
Payer: COMMERCIAL

## 2019-01-07 VITALS
HEART RATE: 91 BPM | RESPIRATION RATE: 18 BRPM | DIASTOLIC BLOOD PRESSURE: 78 MMHG | WEIGHT: 128.42 LBS | OXYGEN SATURATION: 93 % | SYSTOLIC BLOOD PRESSURE: 122 MMHG | BODY MASS INDEX: 21.4 KG/M2 | HEIGHT: 65 IN | TEMPERATURE: 97.7 F

## 2019-01-07 DIAGNOSIS — C56.2 MALIGNANT NEOPLASM OF LEFT OVARY (HCC): ICD-10-CM

## 2019-01-07 ASSESSMENT — PAIN SCALES - GENERAL: PAINLEVEL: NO PAIN

## 2019-01-07 NOTE — PROGRESS NOTES
"Subjective:      Ani Parham is a 58 y.o. female who presents with Chemo Education (Carbo, Taxol/  )      Patient is established with Dr. Martinez for malignant neoplasm of the left ovary.  She will be starting on carboplatin/taxol and is here today for a pre-chemotherapy teaching appointment. Patient is accompanied by her , Aryan.     HPI    Allergies   Allergen Reactions   • Fluconazole Swelling     Face Swelling       ROS       Objective:     /78 (BP Location: Right arm, Patient Position: Sitting, BP Cuff Size: Adult)   Pulse 91   Temp 36.5 °C (97.7 °F) (Temporal)   Resp 18   Ht 1.651 m (5' 5\")   Wt 58.3 kg (128 lb 6.7 oz)   SpO2 93%   BMI 21.37 kg/m²      Physical Exam            Assessment/Plan:     1. Malignant neoplasm of left ovary (HCC)         Patient has malignant neoplasm of the left ovary and will be starting on carboplatin and taxol on 1/9/19 with Dr. Martinez. Educated patient on chemotherapy including but not limited to: Infusion Policies and procedures, cycling of chemotherapy, length of treatment, alopecia, myelosuppression, infection prevention, fatigue, GI distress, use of specific nausea medications, use of sunscreen, chemotherapy precautions at home, support services, and invasive procedures. Patient was provided with drug specific handouts, [facility] chemotherapy and you book, [facility] eating hints book, [facility] side effects sheet. Patient was given tour of Infusion Services and shown where to park and check-in.        The following appointments, labs, and medications have been provided to the patient:    Line: PIV, no appointment needed  Labs: Patient goes to lab [facility]. Orders written by Dr Martinez. Patient already obtained prechemo labs for first visit.  Lab Appointments: patient given calendar of scheduled lab visits  Follow up appts: To be completed by Dr. Martinez  Chemotherapy class: Completed  Nausea medication: Ordered by Dr. Martinez, patient aware of schedule  Nurse cece: Patient educated and " given contact information  Dietician: Recommend nutrition consult.  Will defer to Dr. Martinez.      Spent 90 minutes of continuous, non-interrupted, face-to-face patient contact in which greater than 50% of the visit was spent counseling and coordinating of care.

## 2019-01-09 ENCOUNTER — OUTPATIENT INFUSION SERVICES (OUTPATIENT)
Dept: ONCOLOGY | Facility: MEDICAL CENTER | Age: 59
End: 2019-01-09
Attending: SPECIALIST
Payer: COMMERCIAL

## 2019-01-09 ENCOUNTER — DOCUMENTATION (OUTPATIENT)
Dept: HEMATOLOGY ONCOLOGY | Facility: MEDICAL CENTER | Age: 59
End: 2019-01-09

## 2019-01-09 VITALS
TEMPERATURE: 97.5 F | HEIGHT: 65 IN | WEIGHT: 128.75 LBS | BODY MASS INDEX: 21.45 KG/M2 | RESPIRATION RATE: 18 BRPM | SYSTOLIC BLOOD PRESSURE: 141 MMHG | HEART RATE: 77 BPM | DIASTOLIC BLOOD PRESSURE: 83 MMHG

## 2019-01-09 DIAGNOSIS — Z51.11 ENCOUNTER FOR ANTINEOPLASTIC CHEMOTHERAPY: ICD-10-CM

## 2019-01-09 DIAGNOSIS — C56.9 MALIGNANT NEOPLASM OF OVARY, UNSPECIFIED LATERALITY (HCC): ICD-10-CM

## 2019-01-09 PROCEDURE — 700105 HCHG RX REV CODE 258

## 2019-01-09 PROCEDURE — 96417 CHEMO IV INFUS EACH ADDL SEQ: CPT

## 2019-01-09 PROCEDURE — 700111 HCHG RX REV CODE 636 W/ 250 OVERRIDE (IP)

## 2019-01-09 PROCEDURE — 96413 CHEMO IV INFUSION 1 HR: CPT

## 2019-01-09 PROCEDURE — 700111 HCHG RX REV CODE 636 W/ 250 OVERRIDE (IP): Performed by: SPECIALIST

## 2019-01-09 PROCEDURE — 96375 TX/PRO/DX INJ NEW DRUG ADDON: CPT

## 2019-01-09 PROCEDURE — 304540 HCHG NITRO SET VENT 2ND TUB

## 2019-01-09 PROCEDURE — 96415 CHEMO IV INFUSION ADDL HR: CPT

## 2019-01-09 PROCEDURE — 700105 HCHG RX REV CODE 258: Performed by: SPECIALIST

## 2019-01-09 RX ORDER — ONDANSETRON 4 MG/1
4 TABLET, ORALLY DISINTEGRATING ORAL EVERY 6 HOURS PRN
COMMUNITY
End: 2019-06-14

## 2019-01-09 RX ORDER — DEXAMETHASONE 4 MG/1
2 TABLET ORAL 2 TIMES DAILY
COMMUNITY
End: 2019-06-14

## 2019-01-09 RX ADMIN — FAMOTIDINE 20 MG: 10 INJECTION INTRAVENOUS at 11:11

## 2019-01-09 RX ADMIN — DEXAMETHASONE SODIUM PHOSPHATE 12 MG: 4 INJECTION, SOLUTION INTRAMUSCULAR; INTRAVENOUS at 11:40

## 2019-01-09 RX ADMIN — PACLITAXEL 287 MG: 300 INJECTION, SOLUTION INTRAVENOUS at 12:19

## 2019-01-09 RX ADMIN — CARBOPLATIN 559 MG: 10 INJECTION, SOLUTION INTRAVENOUS at 16:22

## 2019-01-09 RX ADMIN — DIPHENHYDRAMINE HYDROCHLORIDE 50 MG: 50 INJECTION INTRAMUSCULAR; INTRAVENOUS at 11:10

## 2019-01-09 RX ADMIN — ONDANSETRON HYDROCHLORIDE 16 MG: 2 SOLUTION INTRAMUSCULAR; INTRAVENOUS at 11:25

## 2019-01-09 ASSESSMENT — PAIN SCALES - GENERAL: PAINLEVEL_OUTOF10: 0

## 2019-01-09 NOTE — PROGRESS NOTES
Chemotherapy Verification - SECONDARY RN       Height = 65 in  Weight = 58.4 kg  BSA = 1.64 m2       Medication: Paclitaxel  Dose: 175 mg/m2  Calculated Dose: 287 mg                             (In mg/m2, AUC, mg/kg)     Medication: Carboplatin  Dose: AUC 6  Calculated Dose: 568.7 mg                             (In mg/m2, AUC, mg/kg)    Carboplatin calculation (if applicable):  (((140-56)*[id number])*(0.70+(1 *0.15))/(0.83*72)+25)*6 * ((1 =1)+(1 =2)) = 568.651    I confirm that this process was performed independently.

## 2019-01-09 NOTE — PROGRESS NOTES
Chemotherapy Verification - PRIMARY RN      Height = 165.1cm  Weight = 58.4kg  BSA = 1.64m2       Medication: Taxol  Dose: 175mg/m2  Calculated Dose: 287mg                             (In mg/m2, AUC, mg/kg)     Medication: Carbo  Dose: AUC 6  Calculated Dose: 568.65mg                            (In mg/m2, AUC, mg/kg)    Carboplatin calculation (if applicable):  (6*(69.775+25)) = 568.65      I confirm this process was performed independently with the BSA and all final chemotherapy dosing calculations congruent.  Any discrepancies of 5% or greater have been addressed with the chemotherapy pharmacist. The resolution of the discrepancy has been documented in the EPIC progress notes.

## 2019-01-09 NOTE — PROGRESS NOTES
"Pharmacy Chemotherapy Verification Note:    Patient Name: Ani Parham      Dx: ovarian cancer    Protocol: CARBOplatin + PACLItaxel  PACLItaxel 175 mg/m² IV over 3 hrs on day 1   CARBOplatin AUC 5-7.5 IV over 60 min on day 1   Repeat Q21 days for 3-8 cycles dependent on staging  NCCN Guidelines for Ovarian Cancer. V.1.2016.   Radha RAMACHANDRAN, et al. J Clin Oncol. 2003;21(17):3194-200.        /83   Pulse 77   Temp 36.4 °C (97.5 °F) (Temporal)   Resp 18   Ht 1.651 m (5' 5\")   Wt 58.4 kg (128 lb 12 oz)   BMI 21.42 kg/m²  Body surface area is 1.64 meters squared.    [facility] 1/3/18- baseline labs ok for cycle 1  ANC~ 3100 Plt = 257k   Hgb = 13.5     SCr = 0.83mg/dL CrCl ~ 70mL/min   LFT's = WNl TBili = 0.3     Drug Order   (Drug name, dose, route, IV Fluid & volume, frequency, number of doses) Cycle: 1      Previous treatment: surgery 10/29/18     Medication = Paclitaxel (Taxol)  Base Dose = 175mg/m²   Calc Dose: Base Dose x 1.64 m² = 287 mg  Final Dose = 287 mg  Route = IV  Fluid & Volume = NSS 500mL  Admin Duration = Over 3hours          <5% difference, ok to treat with final dose     Medication = Carboplatin (Paraplatin)  Base Dose = AUC 6  Calc Dose: Base Dose x (70 + 25) = 570 mg  Final Dose = 559 mg  Route = IV  Fluid & Volume =  mL  Admin Duration = Over 30 minutes          <5% difference, ok to treat with final dose     By my signature below, I confirm this process was performed independently with the BSA and all final chemotherapy dosing calculations congruent. I have reviewed the above chemotherapy order and that my calculation of the final dose and BSA (when applicable) corroborate those calculations of the  pharmacist. Discrepancies of 5% or greater in the written dose have been addressed and documented within the [facility] Progress notes.    Luis F NewmanD.      "

## 2019-01-09 NOTE — PROGRESS NOTES
"Pharmacy Chemotherapy Calculations    Dx: malignant neoplasm of ovary    Cycle 1, Day 1  Previous treatment = hysterectomy    Regimen: Carbo + Taxol  *Dosing Reference*  Paclitaxel 175 mg/m² IV over 3 hrs on Day 1  Carboplatin AUC 5-6 IV over 30 min on Day 1  21-day cycle for 3-6 cycles (neoadjuvant or stage I) or 6-8 cycles (stages II-IV)  NCCN Guidelines for Ovarian Cancer/Fallopian Tube Cancer/Primary Peritoneal Cancer V.1.2016  Radha RF, et al. J Clin OncoI. 2003;21(17:3194-200)    Allergies:  Fluconazole    /83   Pulse 77   Temp 36.4 °C (97.5 °F) (Temporal)   Resp 18   Ht 1.651 m (5' 5\")   Wt 58.4 kg (128 lb 12 oz)   BMI 21.42 kg/m²  Body surface area is 1.64 meters squared.    Labs 1/3/19 ([facility]): (okay per MD to use labs from 6 days ago)  ANC ~ 3100       Plt = 257 k   Hgb = 13.5       SCr =  0.83    CrCl ~70 mL/min    AST/ALT/AP =  30/22/63 Tbili = 0.3      Paclitaxel (Taxol) 175 mg/m² x 1.64 m² = 287 mg   <5% difference, okay to treat with final dose = 287 mg IV    Carboplatin (Paraplatin) AUC 6 (70 ml/min + 25) = 570 mg   <5% difference, okay to treat with final dose = 559 mg IV      Tamra Feliciano, PharmD  "

## 2019-01-10 NOTE — PROGRESS NOTES
Patient arrived for Day 1 Cycle 1 Taxol/Carbo.   accompanied. Plan of care reviewed, all questions answered.  PIV started, pt reports MD office called her (Celestino) and told her to get her next INR checked next week, hold today as levels therapeutic.  Pt verified she has lab orders for labs to be drawn at [facility].  All pt's labs are to be drawn at [facility].  Pre-meds infused. Taxol titrated to max rate of 199ml/hr. Pt tolerated with no s/s intolerance. Carbo infused without incident. PIV flushed, removed.  Next appt confirmed.  Pt discharged home in great spirits under no apparent distress.

## 2019-01-15 RX ORDER — SODIUM CHLORIDE 9 MG/ML
INJECTION, SOLUTION INTRAVENOUS CONTINUOUS
Status: CANCELLED | OUTPATIENT
Start: 2019-01-30

## 2019-01-15 RX ORDER — 0.9 % SODIUM CHLORIDE 0.9 %
VIAL (ML) INJECTION PRN
Status: CANCELLED | OUTPATIENT
Start: 2019-01-29

## 2019-01-15 RX ORDER — 0.9 % SODIUM CHLORIDE 0.9 %
VIAL (ML) INJECTION PRN
Status: CANCELLED | OUTPATIENT
Start: 2019-01-30

## 2019-01-15 RX ORDER — ONDANSETRON 8 MG/1
8 TABLET, ORALLY DISINTEGRATING ORAL
Status: CANCELLED | OUTPATIENT
Start: 2019-01-30

## 2019-01-15 RX ORDER — 0.9 % SODIUM CHLORIDE 0.9 %
5 VIAL (ML) INJECTION PRN
Status: CANCELLED | OUTPATIENT
Start: 2019-01-30

## 2019-01-15 RX ORDER — ONDANSETRON 2 MG/ML
4 INJECTION INTRAMUSCULAR; INTRAVENOUS
Status: CANCELLED | OUTPATIENT
Start: 2019-01-30

## 2019-01-15 RX ORDER — 0.9 % SODIUM CHLORIDE 0.9 %
5 VIAL (ML) INJECTION PRN
Status: CANCELLED | OUTPATIENT
Start: 2019-01-29

## 2019-01-15 RX ORDER — PROCHLORPERAZINE MALEATE 10 MG
10 TABLET ORAL EVERY 6 HOURS PRN
Status: CANCELLED | OUTPATIENT
Start: 2019-01-30

## 2019-01-16 ENCOUNTER — APPOINTMENT (OUTPATIENT)
Dept: ONCOLOGY | Facility: MEDICAL CENTER | Age: 59
End: 2019-01-16
Attending: SPECIALIST
Payer: COMMERCIAL

## 2019-01-17 NOTE — PROGRESS NOTES
"Nutrition Services: RD Consultation/ New Chemo Start  Met with pt during chemo infusion to assess current intake, appetite, and nutritional status. Pt appears overall well nourished.  Pt reports she eats an overall healthy diet, variety of foods and small frequent meals. Pt denied any GI distress nor recent weight changes. Pt did not express any further nutrition-related questions or concerns at this time.     Assessment:  - Primary Dx: Ovarian Cancer  - PMHx: DVT, Hypothyroidism  - Height: 5'5\"   -   Weight: 128#   -   BMI: 21.3 (WNL classification)    Plan/Recommendations:  1. Provided and discussed handout regarding general nutrition guidelines as well as nutritional recommendations for patient's with Cancer, including tips/tricks should side effects of tx occur.   2. Discussed importance of PO intake/ nutrition with increased protein/kcal needs 2' to the hypermetabolic effects of cancer in order to maintain weight and preserve lean body mass.   3. Provided and went over food lists including healthy snack ideas as well as foods high in protein.   4. Encouraged Hydration    Pt reports understanding and was receptive. RD following and to make further recommendations as indicated.     "

## 2019-01-22 RX ORDER — 0.9 % SODIUM CHLORIDE 0.9 %
5 VIAL (ML) INJECTION PRN
Status: CANCELLED | OUTPATIENT
Start: 2019-02-19

## 2019-01-22 RX ORDER — 0.9 % SODIUM CHLORIDE 0.9 %
5 VIAL (ML) INJECTION PRN
Status: CANCELLED | OUTPATIENT
Start: 2019-02-20

## 2019-01-22 RX ORDER — SODIUM CHLORIDE 9 MG/ML
INJECTION, SOLUTION INTRAVENOUS CONTINUOUS
Status: CANCELLED | OUTPATIENT
Start: 2019-02-20

## 2019-01-22 RX ORDER — ONDANSETRON 2 MG/ML
4 INJECTION INTRAMUSCULAR; INTRAVENOUS
Status: CANCELLED | OUTPATIENT
Start: 2019-02-20

## 2019-01-22 RX ORDER — 0.9 % SODIUM CHLORIDE 0.9 %
VIAL (ML) INJECTION PRN
Status: CANCELLED | OUTPATIENT
Start: 2019-02-19

## 2019-01-22 RX ORDER — ONDANSETRON 8 MG/1
8 TABLET, ORALLY DISINTEGRATING ORAL
Status: CANCELLED | OUTPATIENT
Start: 2019-02-20

## 2019-01-22 RX ORDER — PROCHLORPERAZINE MALEATE 10 MG
10 TABLET ORAL EVERY 6 HOURS PRN
Status: CANCELLED | OUTPATIENT
Start: 2019-02-20

## 2019-01-22 RX ORDER — 0.9 % SODIUM CHLORIDE 0.9 %
VIAL (ML) INJECTION PRN
Status: CANCELLED | OUTPATIENT
Start: 2019-02-20

## 2019-01-23 ENCOUNTER — APPOINTMENT (OUTPATIENT)
Dept: ONCOLOGY | Facility: MEDICAL CENTER | Age: 59
End: 2019-01-23
Attending: SPECIALIST
Payer: COMMERCIAL

## 2019-01-29 NOTE — PROGRESS NOTES
"Pharmacy Chemotherapy Verification Note:    Patient Name: Ani Parham      Dx: ovarian cancer    Protocol: CARBOplatin + PACLItaxel  PACLItaxel 175 mg/m² IV over 3 hrs on day 1   CARBOplatin AUC 5-7.5 IV over 60 min on day 1   Repeat Q21 days for 3-8 cycles dependent on staging  NCCN Guidelines for Ovarian Cancer. V.1.2016.   Radha RAMACHANDRAN, et al. J Clin Oncol. 2003;21(17):3194-200.        /90   Pulse 92   Temp 36.3 °C (97.4 °F) (Temporal)   Resp 18   Ht 1.651 m (5' 5\")   Wt 57.2 kg (126 lb 1.7 oz)   SpO2 92%   BMI 20.98 kg/m²  Body surface area is 1.62 meters squared.    All lab results from [facility] 1/28/19 within treatment parameters.     Drug Order   (Drug name, dose, route, IV Fluid & volume, frequency, number of doses) Cycle: 2     Previous treatment: 1/9/19     Medication = Paclitaxel (Taxol)  Base Dose = 175mg/m²   Calc Dose: Base Dose x 1.62m² = 283.5mg  Final Dose = 287mg  Route = IV  Fluid & Volume = NSS 500mL  Admin Duration = Over 3hours          <5% difference, ok to treat with final dose     Medication = Carboplatin (Paraplatin)  Base Dose = AUC 6  Calc Dose: Base Dose x (81 + 25) = 636mg  Final Dose = 643mg  Route = IV  Fluid & Volume =  mL  Admin Duration = Over 30 minutes          <5% difference, ok to treat with final dose     By my signature below, I confirm this process was performed independently with the BSA and all final chemotherapy dosing calculations congruent. I have reviewed the above chemotherapy order and that my calculation of the final dose and BSA (when applicable) corroborate those calculations of the  pharmacist. Discrepancies of 5% or greater in the written dose have been addressed and documented within the [facility] Progress notes.    Luis F NewmanD.      "

## 2019-01-30 ENCOUNTER — OUTPATIENT INFUSION SERVICES (OUTPATIENT)
Dept: ONCOLOGY | Facility: MEDICAL CENTER | Age: 59
End: 2019-01-30
Attending: SPECIALIST
Payer: COMMERCIAL

## 2019-01-30 VITALS
RESPIRATION RATE: 18 BRPM | BODY MASS INDEX: 21.01 KG/M2 | HEIGHT: 65 IN | HEART RATE: 92 BPM | SYSTOLIC BLOOD PRESSURE: 153 MMHG | DIASTOLIC BLOOD PRESSURE: 90 MMHG | WEIGHT: 126.1 LBS | OXYGEN SATURATION: 92 % | TEMPERATURE: 97.4 F

## 2019-01-30 DIAGNOSIS — C56.9 MALIGNANT NEOPLASM OF OVARY, UNSPECIFIED LATERALITY (HCC): ICD-10-CM

## 2019-01-30 DIAGNOSIS — Z51.11 ENCOUNTER FOR ANTINEOPLASTIC CHEMOTHERAPY: ICD-10-CM

## 2019-01-30 PROCEDURE — 96375 TX/PRO/DX INJ NEW DRUG ADDON: CPT

## 2019-01-30 PROCEDURE — 700105 HCHG RX REV CODE 258

## 2019-01-30 PROCEDURE — 700111 HCHG RX REV CODE 636 W/ 250 OVERRIDE (IP)

## 2019-01-30 PROCEDURE — 96417 CHEMO IV INFUS EACH ADDL SEQ: CPT

## 2019-01-30 PROCEDURE — 96413 CHEMO IV INFUSION 1 HR: CPT

## 2019-01-30 PROCEDURE — 700111 HCHG RX REV CODE 636 W/ 250 OVERRIDE (IP): Performed by: SPECIALIST

## 2019-01-30 PROCEDURE — 96415 CHEMO IV INFUSION ADDL HR: CPT

## 2019-01-30 PROCEDURE — 700105 HCHG RX REV CODE 258: Performed by: SPECIALIST

## 2019-01-30 PROCEDURE — 304540 HCHG NITRO SET VENT 2ND TUB

## 2019-01-30 RX ADMIN — DEXAMETHASONE SODIUM PHOSPHATE 12 MG: 4 INJECTION, SOLUTION INTRAMUSCULAR; INTRAVENOUS at 11:02

## 2019-01-30 RX ADMIN — FAMOTIDINE 20 MG: 10 INJECTION INTRAVENOUS at 11:02

## 2019-01-30 RX ADMIN — SODIUM CHLORIDE 287 MG: 9 INJECTION, SOLUTION INTRAVENOUS at 12:18

## 2019-01-30 RX ADMIN — DIPHENHYDRAMINE HYDROCHLORIDE 50 MG: 50 INJECTION INTRAMUSCULAR; INTRAVENOUS at 11:33

## 2019-01-30 RX ADMIN — ONDANSETRON HYDROCHLORIDE 16 MG: 2 SOLUTION INTRAMUSCULAR; INTRAVENOUS at 11:15

## 2019-01-30 RX ADMIN — CARBOPLATIN 643 MG: 10 INJECTION, SOLUTION INTRAVENOUS at 16:29

## 2019-01-30 NOTE — PROGRESS NOTES
Chemotherapy Verification - PRIMARY RN      Height = 165.1 cm  Weight = 57.2 kg  BSA = 1.62 m2       Medication: Taxol  Dose: 175 mg/m2  Calculated Dose: 283.5 mg                             (In mg/m2, AUC, mg/kg)     Medication: Carboplatin  Dose: AUC 6  Calculated Dose: 636.2 mg                            (In mg/m2, AUC, mg/kg)        Carboplatin calculation (if applicable):  (((140-56)*[id number])*(0.70+(1 *0.15))/(0.7*72)+25)*6 * ((1 =1)+(1 =2)) = 636.2 mg      I confirm this process was performed independently with the BSA and all final chemotherapy dosing calculations congruent.  Any discrepancies of 5% or greater have been addressed with the chemotherapy pharmacist. The resolution of the discrepancy has been documented in the EPIC progress notes.

## 2019-01-30 NOTE — PROGRESS NOTES
"Pharmacy Chemotherapy Calculations    Dx: malignant neoplasm of ovary    Cycle 2  Previous treatment = 1/9/19    Regimen: Carbo + Taxol  Paclitaxel 175 mg/m² IV over 3 hrs on Day 1  Carboplatin AUC 5-6 IV over 30 min on Day 1  21-day cycle for 3-6 cycles (neoadjuvant or stage I) or 6-8 cycles (stages II-IV)  NCCN Guidelines for Ovarian Cancer/Fallopian Tube Cancer/Primary Peritoneal Cancer V.1.2016  Radha RF, et al. J Clin OncoI. 2003;21(17:3194-200)    Allergies:  Fluconazole    /90   Pulse 92   Temp 36.3 °C (97.4 °F) (Temporal)   Resp 18   Ht 1.651 m (5' 5\")   Wt 57.2 kg (126 lb 1.7 oz)   SpO2 92%   BMI 20.98 kg/m²  Body surface area is 1.62 meters squared.    Labs ([facility]) from 1/28/19:  ANC~ 1600 Plt = 193k   Hgb = 13     SCr = 0.66mg/dL CrCl ~ 81mL/min   AST/ALT/AP = 26/27/67 TBili = <0.2       Paclitaxel (Taxol) 175 mg/m² x 1.62 m² = 283.5 mg   <5% difference, okay to treat with final dose = 287 mg IV    Carboplatin (Paraplatin) AUC 6 (81 ml/min + 25) = 636 mg   <5% difference, okay to treat with final dose = 643 mg IV      Jessica Kearney, PharmD, BCOP  "

## 2019-01-30 NOTE — PROGRESS NOTES
Chemotherapy Verification - SECONDARY RN       Height = 65 in  Weight = 57.2 kg  BSA = 1.62 m2       Medication: Paclitaxel  Dose: 175 mg/m2  Calculated Dose: 283.5 mg                             (In mg/m2, AUC, mg/kg)     Medication: Carboplatin  Dose: AUC 6  Calculated Dose: 636.2 mg                             (In mg/m2, AUC, mg/kg)    I confirm that this process was performed independently.

## 2019-01-31 NOTE — PROGRESS NOTES
Patient presents for day one cycle two Taxol /Carboplatin. Reviewed plan of care, patient verbalizes understanding. PIV established, flushes well with brisk blood return. Infusions completed with no new patient complaints, line flushed clear. PIV removed, compression dressing to site. Patient scheduled for next appointment and released in no acute distress.

## 2019-02-20 ENCOUNTER — PATIENT OUTREACH (OUTPATIENT)
Dept: OTHER | Facility: MEDICAL CENTER | Age: 59
End: 2019-02-20

## 2019-02-20 ENCOUNTER — OUTPATIENT INFUSION SERVICES (OUTPATIENT)
Dept: ONCOLOGY | Facility: MEDICAL CENTER | Age: 59
End: 2019-02-20
Attending: SPECIALIST
Payer: COMMERCIAL

## 2019-02-20 VITALS
OXYGEN SATURATION: 93 % | DIASTOLIC BLOOD PRESSURE: 94 MMHG | BODY MASS INDEX: 21.6 KG/M2 | HEIGHT: 65 IN | HEART RATE: 103 BPM | RESPIRATION RATE: 18 BRPM | TEMPERATURE: 97.3 F | WEIGHT: 129.63 LBS | SYSTOLIC BLOOD PRESSURE: 143 MMHG

## 2019-02-20 DIAGNOSIS — Z51.11 ENCOUNTER FOR ANTINEOPLASTIC CHEMOTHERAPY: ICD-10-CM

## 2019-02-20 DIAGNOSIS — C56.9 MALIGNANT NEOPLASM OF OVARY, UNSPECIFIED LATERALITY (HCC): ICD-10-CM

## 2019-02-20 LAB
ALBUMIN SERPL BCP-MCNC: 4.4 G/DL (ref 3.2–4.9)
ALBUMIN/GLOB SERPL: 1.6 G/DL
ALP SERPL-CCNC: 52 U/L (ref 30–99)
ALT SERPL-CCNC: 21 U/L (ref 2–50)
ANION GAP SERPL CALC-SCNC: 10 MMOL/L (ref 0–11.9)
AST SERPL-CCNC: 23 U/L (ref 12–45)
BILIRUB SERPL-MCNC: 0.3 MG/DL (ref 0.1–1.5)
BUN SERPL-MCNC: 26 MG/DL (ref 8–22)
CALCIUM SERPL-MCNC: 9.6 MG/DL (ref 8.5–10.5)
CHLORIDE SERPL-SCNC: 103 MMOL/L (ref 96–112)
CO2 SERPL-SCNC: 24 MMOL/L (ref 20–33)
CREAT SERPL-MCNC: 0.81 MG/DL (ref 0.5–1.4)
GLOBULIN SER CALC-MCNC: 2.8 G/DL (ref 1.9–3.5)
GLUCOSE SERPL-MCNC: 141 MG/DL (ref 65–99)
POTASSIUM SERPL-SCNC: 4.1 MMOL/L (ref 3.6–5.5)
PROT SERPL-MCNC: 7.2 G/DL (ref 6–8.2)
SODIUM SERPL-SCNC: 137 MMOL/L (ref 135–145)

## 2019-02-20 PROCEDURE — 700105 HCHG RX REV CODE 258: Performed by: NURSE PRACTITIONER

## 2019-02-20 PROCEDURE — 96375 TX/PRO/DX INJ NEW DRUG ADDON: CPT

## 2019-02-20 PROCEDURE — 96415 CHEMO IV INFUSION ADDL HR: CPT

## 2019-02-20 PROCEDURE — 96417 CHEMO IV INFUS EACH ADDL SEQ: CPT

## 2019-02-20 PROCEDURE — 96413 CHEMO IV INFUSION 1 HR: CPT

## 2019-02-20 PROCEDURE — 700111 HCHG RX REV CODE 636 W/ 250 OVERRIDE (IP)

## 2019-02-20 PROCEDURE — 80053 COMPREHEN METABOLIC PANEL: CPT

## 2019-02-20 PROCEDURE — 700105 HCHG RX REV CODE 258

## 2019-02-20 PROCEDURE — 304540 HCHG NITRO SET VENT 2ND TUB

## 2019-02-20 PROCEDURE — 700111 HCHG RX REV CODE 636 W/ 250 OVERRIDE (IP): Performed by: NURSE PRACTITIONER

## 2019-02-20 RX ORDER — SODIUM CHLORIDE 9 MG/ML
INJECTION, SOLUTION INTRAVENOUS CONTINUOUS
Status: DISCONTINUED | OUTPATIENT
Start: 2019-02-20 | End: 2019-02-20 | Stop reason: HOSPADM

## 2019-02-20 RX ADMIN — ONDANSETRON HYDROCHLORIDE 16 MG: 2 SOLUTION INTRAMUSCULAR; INTRAVENOUS at 12:50

## 2019-02-20 RX ADMIN — DIPHENHYDRAMINE HYDROCHLORIDE 50 MG: 50 INJECTION INTRAMUSCULAR; INTRAVENOUS at 12:38

## 2019-02-20 RX ADMIN — SODIUM CHLORIDE: 9 INJECTION, SOLUTION INTRAVENOUS at 12:39

## 2019-02-20 RX ADMIN — FAMOTIDINE 20 MG: 10 INJECTION INTRAVENOUS at 12:38

## 2019-02-20 RX ADMIN — DEXAMETHASONE SODIUM PHOSPHATE 12 MG: 4 INJECTION, SOLUTION INTRAMUSCULAR; INTRAVENOUS at 13:08

## 2019-02-20 RX ADMIN — PACLITAXEL 287 MG: 300 INJECTION, SOLUTION INTRAVENOUS at 13:50

## 2019-02-20 RX ADMIN — CARBOPLATIN 643 MG: 10 INJECTION, SOLUTION INTRAVENOUS at 17:18

## 2019-02-20 NOTE — PROGRESS NOTES
"Pharmacy Chemotherapy Verification    Patient Name: Ani Parham      Dx: Ovarian cancer    Protocol: CARBOplatin + PACLItaxel  PACLItaxel 175 mg/m² IV over 3 hrs on day 1   CARBOplatin AUC 5-7.5 IV over 60 min on day 1   Repeat Q21 days for 3-8 cycles dependent on staging  NCCN Guidelines for Ovarian Cancer. V.1.2016.   Radha RAMACHANDRAN, et al. J Clin Oncol. 2003;21(17):3194-200.        Allergies:Fluconazole  /94   Pulse (!) 103   Temp 36.3 °C (97.3 °F) (Temporal)   Resp 18   Ht 1.638 m (5' 4.5\")   Wt 58.8 kg (129 lb 10.1 oz)   SpO2 93%   BMI 21.91 kg/m²  Body surface area is 1.64 meters squared.    Labs 2/20/19  SCr 0.81 CrCl 71.6 mL/min   AST/ALT/AP = 23/21/52 TBili = 0.3    Labs 2/18/19 ([facility])  ANC 2000 Plt 215k Hgb 12.2  SCr 1.49 CrCl 38.9 AST/ALT/AP = 24/18/63 TBili = <0.2    Drug Order   (Drug name, dose, route, IV Fluid & volume, frequency, number of doses) Cycle: 3     Previous treatment: 1/30/19     Medication = PACLItaxel (Taxol)  Base Dose = 175 mg/m²   Calc Dose: Base Dose x 1.64 m² = 287 g  Final Dose = 287 mg  Route = IV  Fluid & Volume =  mL  Admin Duration = Over 3 hours          <5% difference, ok to treat with final dose     Medication = CARBOplatin (Paraplatin)  Base Dose = AUC 6  Calc Dose: Base Dose x (71.6 + 25) = 579.6 mg  Final Dose = 643 mg  Route = IV  Fluid & Volume =  mL  Admin Duration = Over 30 minutes          >5% difference, ok to treat with baseline dose per NINA CADENA/MD Martinez     By my signature below, I confirm this process was performed independently with the BSA and all final chemotherapy dosing calculations congruent. I have reviewed the above chemotherapy order and that my calculation of the final dose and BSA (when applicable) corroborate those calculations of the  pharmacist. Discrepancies of 5% or greater in the written dose have been addressed and documented within the [facility] Progress notes.    ISIDRO Mckeon Pharm.D.      "

## 2019-02-20 NOTE — PROGRESS NOTES
Chemotherapy Verification - SECONDARY RN       Height = 163.8 cm  Weight = 58.8   BSA = 1.635 m2       Medication: Taxol  Dose: 175 mg/m2 Calculated Dose: 286 mg                             (In mg/m2, AUC, mg/kg)     Medication: Carboplatin  Dose: AUC 6  Calculated Dose: 581.9 mg >5% difference, MD NO change                            (In mg/m2, AUC, mg/kg)      Carboplatin calculation (if applicabl(((140-56)*[id number])*(0.70+(1 *0.15))/(0.81*72)+25)*6 * ((1 =1)+(1 =2)) = 581.926 mg    I confirm that this process was performed independently.

## 2019-02-20 NOTE — PROGRESS NOTES
Chemotherapy Verification - PRIMARY RN      Height = 163.8cm  Weight = 58.8kg  BSA = 1.64m2       Medication: PACLitaxel (taxol)  Dose: 175mg/m2  Calculated Dose: 287mg                             (In mg/m2, AUC, mg/kg)     Medication: Carboplatin (Paraplatin)  Dose: AUC 6  Calculated Dose:  643mg per dr. Martinez, see pharm charting                            (In mg/m2, AUC, mg/kg)        Carboplatin calculation (if applicable):  (((140-56)*[id number])*(0.70+(1 *0.15))/(0.81*72)+25)*6 * ((1 =1)+(1 =2)) = 581.926      I confirm this process was performed independently with the BSA and all final chemotherapy dosing calculations congruent.  Any discrepancies of 5% or greater have been addressed with the chemotherapy pharmacist. The resolution of the discrepancy has been documented in the EPIC progress notes.

## 2019-02-20 NOTE — PROGRESS NOTES
"Pharmacy Chemotherapy Calculations    Dx: malignant neoplasm of ovary    Cycle 3  Previous treatment = 1/30/19    Regimen: Carbo + Taxol  Paclitaxel 175 mg/m² IV over 3 hrs on Day 1  Carboplatin AUC 5-6 IV over 30 min on Day 1  21-day cycle for 3-6 cycles (neoadjuvant or stage I) or 6-8 cycles (stages II-IV)  NCCN Guidelines for Ovarian Cancer/Fallopian Tube Cancer/Primary Peritoneal Cancer V.1.2016  Radha RF, et al. J Clin OncoI. 2003;21(17:3194-200)    Allergies:  Fluconazole    /94   Pulse (!) 103   Temp 36.3 °C (97.3 °F) (Temporal)   Resp 18   Ht 1.638 m (5' 4.5\")   Wt 58.8 kg (129 lb 10.1 oz)   SpO2 93%   BMI 21.91 kg/m²  Body surface area is 1.64 meters squared.    All [facility] lab results 2/18/19 within treatment parameters.   Repeat CMP 2/20/19 at [facility] -results within treatment parameters      Paclitaxel (Taxol) 175 mg/m² x 1.64m² = 287mg   <5% difference, okvazquez to treat with final dose = 287mg IV    Carboplatin (Paraplatin) AUC 6 (72ml/min + 25) = 582mg   >5% difference, continue with baseline dose as ordered in BRITTANI figueroa APRN/ Dr. Juan victor to treat with final dose = 643mg IV    Jimbo Mckeon, PharmD  "

## 2019-02-20 NOTE — PROGRESS NOTES
Met with pt briefly and provided folder with support information.  This writer currently with cold symptoms, will call pt to review folder contents.

## 2019-02-21 ENCOUNTER — PATIENT OUTREACH (OUTPATIENT)
Dept: OTHER | Facility: MEDICAL CENTER | Age: 59
End: 2019-02-21

## 2019-02-21 NOTE — PROGRESS NOTES
Received report from EMILY Moore.  Carbo infused without incident. PIV flushed, removed.  Confirmed patients next appt; pt discharged home in good spirits under no apparent distress.

## 2019-02-21 NOTE — PROGRESS NOTES
Patient here for Cycle 3 Carbo/ taxol today. Denies any complaints. Labs WNL for treatment today. PIV placed with brisk blood return. Pre meds given see MAR. Taxol infused continues to infuse as ordered and tolerating well. Report to Gabriel DIAZ whom will assume care of patient for this RN while patient receives Carbo.

## 2019-02-26 ENCOUNTER — TELEPHONE (OUTPATIENT)
Dept: VASCULAR LAB | Facility: MEDICAL CENTER | Age: 59
End: 2019-02-26

## 2019-02-26 RX ORDER — ONDANSETRON 2 MG/ML
4 INJECTION INTRAMUSCULAR; INTRAVENOUS
Status: CANCELLED | OUTPATIENT
Start: 2019-03-13

## 2019-02-26 RX ORDER — 0.9 % SODIUM CHLORIDE 0.9 %
VIAL (ML) INJECTION PRN
Status: CANCELLED | OUTPATIENT
Start: 2019-03-12

## 2019-02-26 RX ORDER — 0.9 % SODIUM CHLORIDE 0.9 %
VIAL (ML) INJECTION PRN
Status: CANCELLED | OUTPATIENT
Start: 2019-03-13

## 2019-02-26 RX ORDER — 0.9 % SODIUM CHLORIDE 0.9 %
5 VIAL (ML) INJECTION PRN
Status: CANCELLED | OUTPATIENT
Start: 2019-03-12

## 2019-02-26 RX ORDER — PROCHLORPERAZINE MALEATE 10 MG
10 TABLET ORAL EVERY 6 HOURS PRN
Status: CANCELLED | OUTPATIENT
Start: 2019-03-13

## 2019-02-26 RX ORDER — ONDANSETRON 8 MG/1
8 TABLET, ORALLY DISINTEGRATING ORAL
Status: CANCELLED | OUTPATIENT
Start: 2019-03-13

## 2019-02-26 RX ORDER — SODIUM CHLORIDE 9 MG/ML
INJECTION, SOLUTION INTRAVENOUS CONTINUOUS
Status: CANCELLED | OUTPATIENT
Start: 2019-03-13

## 2019-02-26 RX ORDER — 0.9 % SODIUM CHLORIDE 0.9 %
5 VIAL (ML) INJECTION PRN
Status: CANCELLED | OUTPATIENT
Start: 2019-03-13

## 2019-02-26 NOTE — TELEPHONE ENCOUNTER
Spoke with pt over phone regarding IVC filter protocol.  She states she has discussed the filter with Dr. Martinez.  The plan is to remove the filter 3 months after she has completed chemotherapy.  This will likely be in July.  Will follow up with pt in June to discuss further.    Meenakshi Joyce APRAPOLLO  [facility]

## 2019-03-04 RX ORDER — 0.9 % SODIUM CHLORIDE 0.9 %
VIAL (ML) INJECTION PRN
Status: CANCELLED | OUTPATIENT
Start: 2019-04-02

## 2019-03-04 RX ORDER — SODIUM CHLORIDE 9 MG/ML
INJECTION, SOLUTION INTRAVENOUS CONTINUOUS
Status: CANCELLED | OUTPATIENT
Start: 2019-04-03

## 2019-03-04 RX ORDER — PROCHLORPERAZINE MALEATE 10 MG
10 TABLET ORAL EVERY 6 HOURS PRN
Status: CANCELLED | OUTPATIENT
Start: 2019-04-03

## 2019-03-04 RX ORDER — 0.9 % SODIUM CHLORIDE 0.9 %
VIAL (ML) INJECTION PRN
Status: CANCELLED | OUTPATIENT
Start: 2019-04-03

## 2019-03-04 RX ORDER — ONDANSETRON 8 MG/1
8 TABLET, ORALLY DISINTEGRATING ORAL
Status: CANCELLED | OUTPATIENT
Start: 2019-04-03

## 2019-03-04 RX ORDER — 0.9 % SODIUM CHLORIDE 0.9 %
5 VIAL (ML) INJECTION PRN
Status: CANCELLED | OUTPATIENT
Start: 2019-04-02

## 2019-03-04 RX ORDER — 0.9 % SODIUM CHLORIDE 0.9 %
5 VIAL (ML) INJECTION PRN
Status: CANCELLED | OUTPATIENT
Start: 2019-04-03

## 2019-03-04 RX ORDER — ONDANSETRON 2 MG/ML
4 INJECTION INTRAMUSCULAR; INTRAVENOUS
Status: CANCELLED | OUTPATIENT
Start: 2019-04-03

## 2019-03-13 ENCOUNTER — DOCUMENTATION (OUTPATIENT)
Dept: HEMATOLOGY ONCOLOGY | Facility: MEDICAL CENTER | Age: 59
End: 2019-03-13

## 2019-03-13 ENCOUNTER — OUTPATIENT INFUSION SERVICES (OUTPATIENT)
Dept: ONCOLOGY | Facility: MEDICAL CENTER | Age: 59
End: 2019-03-13
Attending: SPECIALIST
Payer: COMMERCIAL

## 2019-03-13 VITALS
OXYGEN SATURATION: 97 % | WEIGHT: 131.17 LBS | HEART RATE: 88 BPM | TEMPERATURE: 97.4 F | BODY MASS INDEX: 21.85 KG/M2 | DIASTOLIC BLOOD PRESSURE: 83 MMHG | HEIGHT: 65 IN | RESPIRATION RATE: 18 BRPM | SYSTOLIC BLOOD PRESSURE: 152 MMHG

## 2019-03-13 DIAGNOSIS — Z51.11 ENCOUNTER FOR ANTINEOPLASTIC CHEMOTHERAPY: ICD-10-CM

## 2019-03-13 DIAGNOSIS — C56.9 MALIGNANT NEOPLASM OF OVARY, UNSPECIFIED LATERALITY (HCC): ICD-10-CM

## 2019-03-13 PROCEDURE — 96417 CHEMO IV INFUS EACH ADDL SEQ: CPT

## 2019-03-13 PROCEDURE — 96368 THER/DIAG CONCURRENT INF: CPT

## 2019-03-13 PROCEDURE — 96415 CHEMO IV INFUSION ADDL HR: CPT

## 2019-03-13 PROCEDURE — 700111 HCHG RX REV CODE 636 W/ 250 OVERRIDE (IP): Performed by: SPECIALIST

## 2019-03-13 PROCEDURE — 304540 HCHG NITRO SET VENT 2ND TUB

## 2019-03-13 PROCEDURE — 700105 HCHG RX REV CODE 258

## 2019-03-13 PROCEDURE — 700111 HCHG RX REV CODE 636 W/ 250 OVERRIDE (IP)

## 2019-03-13 PROCEDURE — 96413 CHEMO IV INFUSION 1 HR: CPT

## 2019-03-13 PROCEDURE — 96375 TX/PRO/DX INJ NEW DRUG ADDON: CPT

## 2019-03-13 PROCEDURE — 700105 HCHG RX REV CODE 258: Performed by: NURSE PRACTITIONER

## 2019-03-13 PROCEDURE — 700111 HCHG RX REV CODE 636 W/ 250 OVERRIDE (IP): Performed by: NURSE PRACTITIONER

## 2019-03-13 RX ORDER — MAGNESIUM SULFATE 1 G/100ML
1 INJECTION INTRAVENOUS ONCE
Status: COMPLETED | OUTPATIENT
Start: 2019-03-13 | End: 2019-03-13

## 2019-03-13 RX ADMIN — MAGNESIUM SULFATE IN DEXTROSE 1 G: 10 INJECTION, SOLUTION INTRAVENOUS at 12:53

## 2019-03-13 RX ADMIN — DEXAMETHASONE SODIUM PHOSPHATE 12 MG: 4 INJECTION, SOLUTION INTRAMUSCULAR; INTRAVENOUS at 12:15

## 2019-03-13 RX ADMIN — FAMOTIDINE 20 MG: 10 INJECTION INTRAVENOUS at 11:45

## 2019-03-13 RX ADMIN — DIPHENHYDRAMINE HYDROCHLORIDE 50 MG: 50 INJECTION INTRAMUSCULAR; INTRAVENOUS at 11:47

## 2019-03-13 RX ADMIN — ONDANSETRON HYDROCHLORIDE 16 MG: 2 SOLUTION INTRAMUSCULAR; INTRAVENOUS at 12:00

## 2019-03-13 RX ADMIN — CARBOPLATIN 643 MG: 10 INJECTION, SOLUTION INTRAVENOUS at 15:52

## 2019-03-13 RX ADMIN — PACLITAXEL 287 MG: 300 INJECTION, SOLUTION INTRAVENOUS at 12:43

## 2019-03-13 NOTE — PROGRESS NOTES
Nutrition Services: Update  Weight: 131 lbs, per stand up scale in [facility] = stable, slight gain since last RD assessment.   Last labs available for review: 2/20     Met with pt during chemotherapy treatment to follow-up on current nutrition status. Appetite remains strong and patient denies changes in bowel habits, N/V at this time.  She does anticipate constipation for a few days after each chemo; therefore, she brought senokot with her to today's treatment to be proactive.  Encouraged PO fluids.  Pt has Oncology RD contact information should further questions or concerns arise.     RD available PRN [id number].

## 2019-03-13 NOTE — PROGRESS NOTES
"Pharmacy Chemotherapy Verification    Patient Name: Ani Parham      Dx: Ovarian cancer    Protocol: CARBOplatin + PACLItaxel  PACLItaxel 175 mg/m² IV over 3 hrs on day 1   CARBOplatin AUC 5-7.5 IV over 60 min on day 1   Repeat Q21 days for 3-8 cycles dependent on staging  NCCN Guidelines for Ovarian Cancer. V.1.2016.   Radha RAMACHANDRAN, et al. J Clin Oncol. 2003;21(17):3194-200.        Allergies:Fluconazole  /83   Pulse 88   Temp 36.3 °C (97.4 °F) (Temporal)   Resp 18   Ht 1.651 m (5' 5\")   Wt 59.5 kg (131 lb 2.8 oz)   SpO2 97%   BMI 21.83 kg/m²  Body surface area is 1.65 meters squared.    All lab results 3/11/19 [facility] within treatment parameters.     Drug Order   (Drug name, dose, route, IV Fluid & volume, frequency, number of doses) Cycle: 4     Previous treatment: 2/20/19     Medication = PACLItaxel (Taxol)  Base Dose = 175 mg/m²   Calc Dose: Base Dose x 1.65m² = 288.8mg  Final Dose = 287mg  Route = IV  Fluid & Volume =  mL  Admin Duration = Over 3 hours          <5% difference, ok to treat with final dose     Medication = CARBOplatin (Paraplatin)  Base Dose = AUC 6  Calc Dose: Base Dose x (66 + 25) = 546 mg  Final Dose = 643mg  Route = IV  Fluid & Volume =  mL   Admin Duration = Over 30 minutes          >5% difference, ok to treat with baseline dose per DAHIANA HUTCHINSON/ Dr. Martinez     By my signature below, I confirm this process was performed independently with the BSA and all final chemotherapy dosing calculations congruent. I have reviewed the above chemotherapy order and that my calculation of the final dose and BSA (when applicable) corroborate those calculations of the  pharmacist. Discrepancies of 5% or greater in the written dose have been addressed and documented within the [facility] Progress notes.    Luis F NewmanD.      "

## 2019-03-13 NOTE — PROGRESS NOTES
Chemotherapy Verification - PRIMARY RN      Height = 1.651 m  Weight = 59.5 kg  BSA = 1.65 m2       Medication: paclitaxel  Dose: 175 mg/m2  Calculated Dose: 288.75 mg                             (In mg/m2, AUC, mg/kg)     Medication: carboplatin  Dose: AUC=6  Calculated Dose: 541.09 mg > 5% difference ok to treat w/ prescribed dose of 643 mg ( see pharmacy note)                            (In mg/m2, AUC, mg/kg)    Carboplatin calculation (if applicable):  (6*(65.182+25)) = 541.092      I confirm this process was performed independently with the BSA and all final chemotherapy dosing calculations congruent.  Any discrepancies of 5% or greater have been addressed with the chemotherapy pharmacist. The resolution of the discrepancy has been documented in the EPIC progress notes.

## 2019-03-13 NOTE — PROGRESS NOTES
Chemotherapy Verification - SECONDARY RN       Height = 165.1 cm  Weight = 59.5 kg  BSA = 1.65 m2      Medication: Taxol  Dose: 175 mg/m2  Calculated Dose: 289.08 mg                     (In mg/m2, AUC, mg/kg)     Medication: Carboplatin  Dose: AUC 6  Calculated Dose: 587 mg, no change per MD                             (In mg/m2, AUC, mg/kg)    Carboplatin calculation (if applicable):    (((140-56)*[id number])*(0.70+(1 *0.15))/(0.81*72)+25)*6 * ((1 =1)+(1 =2)) = 587.068 mg, >5% difference, no change per MD  I confirm that this process was performed independently.

## 2019-03-13 NOTE — PROGRESS NOTES
"Pharmacy Chemotherapy Calculations    Dx: malignant neoplasm of ovary    Cycle 4  Previous treatment = 2019    Regimen: Carbo + Taxol  Paclitaxel 175 mg/m² IV over 3 hrs on Day 1  Carboplatin AUC 5-6 IV over 30 min on Day 1  21-day cycle for 3-6 cycles (neoadjuvant or stage I) or 6-8 cycles (stages II-IV)  NCCN Guidelines for Ovarian Cancer/Fallopian Tube Cancer/Primary Peritoneal Cancer V.1.2016  Radha RF, et al. J Clin OncoI. 2003;21(17:3194-200)    Allergies:  Fluconazole    /83   Pulse 88   Temp 36.3 °C (97.4 °F) (Temporal)   Resp 18   Ht 1.651 m (5' 5\")   Wt 59.5 kg (131 lb 2.8 oz)   SpO2 97%   BMI 21.83 kg/m²  Body surface area is 1.65 meters squared.    [facility] 3/11/2019:  ANC: 1100      WBC: 3.0     Plt: 148k   Hgb/Hct: 12.1/35.3     SCr: 0.9 mg/dL CrCl: 65.1 mL/min (min SCr of 0.7)   Ma.7   K: 4.5  Na: 138          Glu: 103  LFT's: WNL TBili = <0.2       Paclitaxel (Taxol) 175 mg/m² x Body surface area is 1.65 meters squared.m² = 288.75 mg   <5% difference, okay to treat with final dose = 287mg IV    Carboplatin (Paraplatin) AUC 6 (65.1 ml/min + 25) = 543 mg   >5% difference, continue with baseline dose as ordered in [place] T.OEVANGELINA Yap/ Dr. Juan victor to treat with final dose = 643 mg IV    JOEY Topol, PharmD  "

## 2019-03-13 NOTE — PROGRESS NOTES
[facility]/taxol for ovarian cancer.  Pt w/ no s/s of infection, pt has no complaints at this time.  PIV established in [facility], brisk blood return noted, flushed per protocol.  Labs drawn 3-11, verified results w/n parameters for tx today.  Pt magnesium low at 1.7, will replace per protocol.  Pre-meds infused w. No adverse reaction.  Taxol infused w/ filter in place w/ no adverse reactions.  Carboplatin infused w/ no adverse reaction.  PIV w/ brisk blood return post-chemo, flushed per protocol and removed, gauze and coban dressing applied. Pt left on foot in NAD.  Gave pt printout of future appts.

## 2019-03-18 NOTE — PROGRESS NOTES
Late entry for 2/21/19.  Telephone call to follow up on needs and barriers.  Pt stated she reviewed the packet I provided to her yesterday.  Discussed services available to pt and support groups.  Support & services offered.  No needs verbalized at this time. Stated she is doing very well and denies needs.  Encouraged to call with needs or questions.

## 2019-03-26 RX ORDER — PROCHLORPERAZINE MALEATE 10 MG
10 TABLET ORAL EVERY 6 HOURS PRN
Status: CANCELLED | OUTPATIENT
Start: 2019-04-24

## 2019-03-26 RX ORDER — SODIUM CHLORIDE 9 MG/ML
INJECTION, SOLUTION INTRAVENOUS CONTINUOUS
Status: CANCELLED | OUTPATIENT
Start: 2019-04-24

## 2019-03-26 RX ORDER — ONDANSETRON 2 MG/ML
4 INJECTION INTRAMUSCULAR; INTRAVENOUS
Status: CANCELLED | OUTPATIENT
Start: 2019-04-24

## 2019-03-26 RX ORDER — 0.9 % SODIUM CHLORIDE 0.9 %
VIAL (ML) INJECTION PRN
Status: CANCELLED | OUTPATIENT
Start: 2019-04-23

## 2019-03-26 RX ORDER — 0.9 % SODIUM CHLORIDE 0.9 %
VIAL (ML) INJECTION PRN
Status: CANCELLED | OUTPATIENT
Start: 2019-04-24

## 2019-03-26 RX ORDER — 0.9 % SODIUM CHLORIDE 0.9 %
5 VIAL (ML) INJECTION PRN
Status: CANCELLED | OUTPATIENT
Start: 2019-04-23

## 2019-03-26 RX ORDER — 0.9 % SODIUM CHLORIDE 0.9 %
5 VIAL (ML) INJECTION PRN
Status: CANCELLED | OUTPATIENT
Start: 2019-04-24

## 2019-03-26 RX ORDER — ONDANSETRON 8 MG/1
8 TABLET, ORALLY DISINTEGRATING ORAL
Status: CANCELLED | OUTPATIENT
Start: 2019-04-24

## 2019-04-03 ENCOUNTER — OUTPATIENT INFUSION SERVICES (OUTPATIENT)
Dept: ONCOLOGY | Facility: MEDICAL CENTER | Age: 59
End: 2019-04-03
Attending: SPECIALIST
Payer: COMMERCIAL

## 2019-04-03 VITALS
BODY MASS INDEX: 22.22 KG/M2 | DIASTOLIC BLOOD PRESSURE: 90 MMHG | HEIGHT: 65 IN | WEIGHT: 133.38 LBS | RESPIRATION RATE: 18 BRPM | SYSTOLIC BLOOD PRESSURE: 144 MMHG | TEMPERATURE: 98.6 F | HEART RATE: 96 BPM | OXYGEN SATURATION: 97 %

## 2019-04-03 DIAGNOSIS — Z51.11 ENCOUNTER FOR ANTINEOPLASTIC CHEMOTHERAPY: ICD-10-CM

## 2019-04-03 DIAGNOSIS — C56.9 MALIGNANT NEOPLASM OF OVARY, UNSPECIFIED LATERALITY (HCC): ICD-10-CM

## 2019-04-03 PROCEDURE — 96413 CHEMO IV INFUSION 1 HR: CPT

## 2019-04-03 PROCEDURE — 700111 HCHG RX REV CODE 636 W/ 250 OVERRIDE (IP): Performed by: SPECIALIST

## 2019-04-03 PROCEDURE — 304540 HCHG NITRO SET VENT 2ND TUB

## 2019-04-03 PROCEDURE — 700111 HCHG RX REV CODE 636 W/ 250 OVERRIDE (IP)

## 2019-04-03 PROCEDURE — 700105 HCHG RX REV CODE 258: Performed by: SPECIALIST

## 2019-04-03 PROCEDURE — 96417 CHEMO IV INFUS EACH ADDL SEQ: CPT

## 2019-04-03 PROCEDURE — 96415 CHEMO IV INFUSION ADDL HR: CPT

## 2019-04-03 PROCEDURE — 700105 HCHG RX REV CODE 258

## 2019-04-03 PROCEDURE — 96375 TX/PRO/DX INJ NEW DRUG ADDON: CPT

## 2019-04-03 RX ADMIN — DEXAMETHASONE SODIUM PHOSPHATE 12 MG: 4 INJECTION, SOLUTION INTRAMUSCULAR; INTRAVENOUS at 12:15

## 2019-04-03 RX ADMIN — FAMOTIDINE 20 MG: 10 INJECTION INTRAVENOUS at 11:43

## 2019-04-03 RX ADMIN — ONDANSETRON HYDROCHLORIDE 16 MG: 2 SOLUTION INTRAMUSCULAR; INTRAVENOUS at 11:58

## 2019-04-03 RX ADMIN — CARBOPLATIN 643 MG: 10 INJECTION, SOLUTION INTRAVENOUS at 16:14

## 2019-04-03 RX ADMIN — DIPHENHYDRAMINE HYDROCHLORIDE 50 MG: 50 INJECTION INTRAMUSCULAR; INTRAVENOUS at 11:45

## 2019-04-03 RX ADMIN — SODIUM CHLORIDE 287 MG: 9 INJECTION, SOLUTION INTRAVENOUS at 12:51

## 2019-04-03 NOTE — PROGRESS NOTES
"Pharmacy Chemotherapy Verification    Patient Name: Ani Parham      Dx: Ovarian cancer    Protocol: CARBOplatin + PACLItaxel  PACLItaxel 175 mg/m² IV over 3 hrs on day 1   CARBOplatin AUC 5-7.5 IV over 60 min on day 1   Repeat Q21 days for 3-8 cycles dependent on staging  NCCN Guidelines for Ovarian Cancer. V.1.2016.   Radha RAMACHANDRAN, et al. J Clin Oncol. 2003;21(17):3194-200.        Allergies:Fluconazole  /90   Pulse 96   Temp 37 °C (98.6 °F) (Temporal)   Resp 18   Ht 1.651 m (5' 5\")   Wt 60.5 kg (133 lb 6.1 oz)   SpO2 97%   BMI 22.20 kg/m²  Body surface area is 1.67 meters squared.    Labs 4/1/19 ([facility])  ANC~ 1600 Plt = 166k   Hgb = 11.4     SCr = 0.77 mg/dL CrCl ~ 77.4 mL/min   LFT's = WNLs  TBili = 0.3     Drug Order   (Drug name, dose, route, IV Fluid & volume, frequency, number of doses) Cycle: 5     Previous treatment: C4 on 3/13/19     Medication = PACLItaxel (Taxol)  Base Dose = 175 mg/m²   Calc Dose: Base Dose x 1.67 m² = 292.25 mg  Final Dose = 287 mg  Route = IV  Fluid & Volume =  mL  Admin Duration = Over 3 hours          <5% difference, ok to treat with final dose     Medication = CARBOplatin (Paraplatin)  Base Dose = AUC 6  Calc Dose: Base Dose x (77.4 + 25) = 614.4 mg  Final Dose = 643 mg  Route = IV  Fluid & Volume =  mL   Admin Duration = Over 30 minutes          <5% difference, ok to treat with final dose     By my signature below, I confirm this process was performed independently with the BSA and all final chemotherapy dosing calculations congruent. I have reviewed the above chemotherapy order and that my calculation of the final dose and BSA (when applicable) corroborate those calculations of the  pharmacist. Discrepancies of 5% or greater in the written dose have been addressed and documented within the [facility] Progress notes.    Gonzalez Butcher, PharmD        "

## 2019-04-03 NOTE — PROGRESS NOTES
Chemotherapy Verification - SECONDARY RN       Height = 65 in  Weight = 60.5 kg  BSA = 1.67 m2       Medication: Paclitaxel  Dose: 175 mg/m2  Calculated Dose: 292.3 mg                             (In mg/m2, AUC, mg/kg)     Medication: Carboplatin  Dose: AUC 6  Calculated Dose: 617.5 mg                             (In mg/m2, AUC, mg/kg)    I confirm that this process was performed independently.

## 2019-04-03 NOTE — PROGRESS NOTES
Chemotherapy Verification - PRIMARY RN      Height = 165.1 m  Weight = 60.5 kg  BSA = 1.67 m2       Medication: Taxol  Dose: 175 mg/m2  Calculated Dose: 292.25 mg                             (In mg/m2, AUC, mg/kg)     Medication: Carboplatin  Dose: AUC = 6  Calculated Dose: 617.5 mg                             (In mg/m2, AUC, mg/kg)      Carboplatin calculation (if applicable):  (((140-56)*[id number])*(0.70+(1 *0.15))/(0.77*72)+25)*6 * ((1 =1)+(1 =2)) = 617.499 mg      I confirm this process was performed independently with the BSA and all final chemotherapy dosing calculations congruent.  Any discrepancies of 5% or greater have been addressed with the chemotherapy pharmacist. The resolution of the discrepancy has been documented in the EPIC progress notes.

## 2019-04-04 NOTE — PROGRESS NOTES
Pt arrived to [facility], ambulatory, for C5 Carbo/Taxol. Pt voices no complaints. Labs reviewed from 4/1, pt within parameters to treat today. 24g PIV established in the L-FA, positive blood return noted. Pre-medications, taxol, and carboplatin infused with no s/sx of adverse reaction. PIV flushed and removed. Pt left IS with no s/sx of distress. Follow up appointment confirmed.

## 2019-04-24 ENCOUNTER — OUTPATIENT INFUSION SERVICES (OUTPATIENT)
Dept: ONCOLOGY | Facility: MEDICAL CENTER | Age: 59
End: 2019-04-24
Attending: SPECIALIST
Payer: COMMERCIAL

## 2019-04-24 VITALS
BODY MASS INDEX: 22.81 KG/M2 | HEART RATE: 87 BPM | OXYGEN SATURATION: 97 % | SYSTOLIC BLOOD PRESSURE: 118 MMHG | WEIGHT: 136.91 LBS | DIASTOLIC BLOOD PRESSURE: 72 MMHG | HEIGHT: 65 IN | TEMPERATURE: 97 F | RESPIRATION RATE: 18 BRPM

## 2019-04-24 DIAGNOSIS — C56.2 MALIGNANT NEOPLASM OF LEFT OVARY (HCC): ICD-10-CM

## 2019-04-24 DIAGNOSIS — C56.9 MALIGNANT NEOPLASM OF OVARY, UNSPECIFIED LATERALITY (HCC): ICD-10-CM

## 2019-04-24 DIAGNOSIS — Z51.11 ENCOUNTER FOR ANTINEOPLASTIC CHEMOTHERAPY: ICD-10-CM

## 2019-04-24 PROCEDURE — 304540 HCHG NITRO SET VENT 2ND TUB

## 2019-04-24 PROCEDURE — 700111 HCHG RX REV CODE 636 W/ 250 OVERRIDE (IP): Performed by: NURSE PRACTITIONER

## 2019-04-24 PROCEDURE — 96417 CHEMO IV INFUS EACH ADDL SEQ: CPT

## 2019-04-24 PROCEDURE — 96413 CHEMO IV INFUSION 1 HR: CPT

## 2019-04-24 PROCEDURE — 96415 CHEMO IV INFUSION ADDL HR: CPT

## 2019-04-24 PROCEDURE — 700105 HCHG RX REV CODE 258

## 2019-04-24 PROCEDURE — 700105 HCHG RX REV CODE 258: Performed by: NURSE PRACTITIONER

## 2019-04-24 PROCEDURE — 700111 HCHG RX REV CODE 636 W/ 250 OVERRIDE (IP)

## 2019-04-24 PROCEDURE — 700102 HCHG RX REV CODE 250 W/ 637 OVERRIDE(OP): Performed by: NURSE PRACTITIONER

## 2019-04-24 PROCEDURE — 96375 TX/PRO/DX INJ NEW DRUG ADDON: CPT

## 2019-04-24 RX ORDER — PROCHLORPERAZINE MALEATE 10 MG
10 TABLET ORAL EVERY 6 HOURS PRN
Status: DISCONTINUED | OUTPATIENT
Start: 2019-04-24 | End: 2019-04-24 | Stop reason: HOSPADM

## 2019-04-24 RX ADMIN — FAMOTIDINE 20 MG: 10 INJECTION INTRAVENOUS at 11:00

## 2019-04-24 RX ADMIN — ONDANSETRON HYDROCHLORIDE 16 MG: 2 SOLUTION INTRAMUSCULAR; INTRAVENOUS at 11:35

## 2019-04-24 RX ADMIN — PROCHLORPERAZINE MALEATE 10 MG: 10 TABLET, FILM COATED ORAL at 16:02

## 2019-04-24 RX ADMIN — CARBOPLATIN 643 MG: 10 INJECTION, SOLUTION INTRAVENOUS at 15:25

## 2019-04-24 RX ADMIN — DIPHENHYDRAMINE HYDROCHLORIDE 50 MG: 50 INJECTION INTRAMUSCULAR; INTRAVENOUS at 11:05

## 2019-04-24 RX ADMIN — DEXAMETHASONE SODIUM PHOSPHATE 12 MG: 4 INJECTION, SOLUTION INTRAMUSCULAR; INTRAVENOUS at 11:20

## 2019-04-24 NOTE — PROGRESS NOTES
"Pharmacy Chemotherapy Verification  Patient Name: Ani Parham  Dx: Malignant neoplasm of ovary    Cycle 6  Previous treatment = C5 on 4/3/19    Regimen: CARBOplatin + Taxol  PACLitaxel 175 mg/m² IV over 3 hrs on Day 1  CARBOplatin AUC 5-6 IV over 30 min on Day 1  21-day cycle for 3-6 cycles (neoadjuvant or stage I) or 6-8 cycles (stages II-IV)  NCCN Guidelines for Ovarian Cancer/Fallopian Tube Cancer/Primary Peritoneal Cancer V.1.2016  Radha RF, et al. J Clin OncoI. 2003;21(17:3194-200)    Allergies:  Fluconazole    /72   Pulse 87   Temp 36.1 °C (97 °F) (Temporal)   Resp 18   Ht 1.651 m (5' 5\")   Wt 62.1 kg (136 lb 14.5 oz)   SpO2 97%   BMI 22.78 kg/m²  Body surface area is 1.69 meters squared.    Labs 4/22/19 ([facility])  ANC~ 1000 Plt = 142k   Hgb = 11     SCr = 1.2 mg/dL CrCl ~ 51 mL/min   LFT's = WNLs  TBili = < 0.2  Mg = 1.8 K = 4.2    PACLitaxel (Taxol) 175 mg/m² x 1.69 m² = 295.75 mg   <5% difference, okay to treat with final dose = 287 mg IV     CARBOplatin (Paraplatin) AUC 6 (51 mL/min + 25) = 456 mg   >5% difference, okay to proceed with final dose as written per Guido PA = 643 mg IV    Gonzalez Butcher, PharmD    "

## 2019-04-24 NOTE — PROGRESS NOTES
Chemotherapy Verification - SECONDARY RN       Height = 65 in  Weight = 62.1 kg  BSA = 1.69 m2       Medication: Paclitaxel  Dose: 175 mg/m2  Calculated Dose: 295.7 mg                             (In mg/m2, AUC, mg/kg)     Medication: Carboplatin  Dose: AUC 6  Calculated Dose: 643 mg (per MD order calculated using baseline wt of 58kg and creatinine of 0.7)                             (In mg/m2, AUC, mg/kg)    Carboplatin calculation (if applicable):  (((140-56)*58)*(0.70+(1 *0.15))/(0.7*72)+25)*6 * ((1 =1)+(1 =2)) = 643    I confirm that this process was performed independently.

## 2019-04-24 NOTE — PROGRESS NOTES
"Pharmacy Chemotherapy Verification    Patient Name: Ani Parham      Dx: Ovarian cancer    Protocol: CARBOplatin + PACLItaxel  PACLItaxel 175 mg/m² IV over 3 hrs on day 1   CARBOplatin AUC 5-7.5 IV over 60 min on day 1   Repeat Q21 days for 3-8 cycles dependent on staging  NCCN Guidelines for Ovarian Cancer. V.1.2016.   Radha RAMACHANDRAN, et al. J Clin Oncol. 2003;21(17):3194-200.        Allergies:Fluconazole  /72   Pulse 87   Temp 36.1 °C (97 °F) (Temporal)   Resp 18   Ht 1.651 m (5' 5\")   Wt 62.1 kg (136 lb 14.5 oz)   SpO2 97%   BMI 22.78 kg/m²  Body surface area is 1.69 meters squared.    Labs 4/22/19 ([facility])  ANC 1000 Hgb 11  Plt 142k  SCr 1.2 CrCl 50.9 mL/min   AST/ALT/AP = 27/22/49 Tbili <0.2    Drug Order   (Drug name, dose, route, IV Fluid & volume, frequency, number of doses) Cycle: 6     Previous treatment: C5 on 4/3/19     Medication = PACLItaxel (Taxol)  Base Dose = 175 mg/m²   Calc Dose: Base Dose x 1.69 m² = 295.75 mg  Final Dose = 287 mg  Route = IV  Fluid & Volume =  mL  Admin Duration = Over 3 hours          <5% difference, ok to treat with final dose     Medication = CARBOplatin (Paraplatin)  Base Dose = AUC 6  Calc Dose: Base Dose x (51 mL/min + 25) = 456 mg  Final Dose = 643 mg  Route = IV  Fluid & Volume =  mL   Admin Duration = Over 30 minutes          >5% difference, NAYLA CADENA/MD Martinez aware of current renal function, patient tolerating treatment. OK to proceed with baseline dose.      By my signature below, I confirm this process was performed independently with the BSA and all final chemotherapy dosing calculations congruent. I have reviewed the above chemotherapy order and that my calculation of the final dose and BSA (when applicable) corroborate those calculations of the  pharmacist. Discrepancies of 5% or greater in the written dose have been addressed and documented within the [facility] Progress notes.    Katie Garcia, PharmD, BCOP        "

## 2019-04-24 NOTE — PROGRESS NOTES
s into [facility] for Cycle 6 of Taxol / Carboplatin for Ovarian Cancer. Pt denied having any new or acute complaints today, reports tolerating past treatments well. PIV started, had + blood return flushed briskly. Pt given Chemotherapy as prescribed, tolerated well, denied having any complaints during or after infusion. PIV discontinued, bleeding controlled with gauze and coban. Discharge home to self care in [facility]. No further appointments needed at this time.

## 2019-04-24 NOTE — PROGRESS NOTES
Chemotherapy Verification - PRIMARY RN      Height = 65 in  Weight = 136 lb  BSA = 1.69 m2       Medication: Taxol  Dose: 175 mg/m2  Calculated Dose: 295 mg                             (In mg/m2, AUC, mg/kg)     Medication: Carboplatin  Dose: 6 AUC  Calculated Dose: 643 mg / Using 's baseline                             (In mg/m2, AUC, mg/kg)    Carboplatin calculation (if applicable): (((140-56)*58)*(0.70+(1 *0.15))/(0.7*72)+25)*6 * ((1 =1)+(1 =2)) = 643 mg      I confirm this process was performed independently with the BSA and all final chemotherapy dosing calculations congruent.  Any discrepancies of 5% or greater have been addressed with the chemotherapy pharmacist. The resolution of the discrepancy has been documented in the EPIC progress notes.

## 2019-04-25 ENCOUNTER — PATIENT OUTREACH (OUTPATIENT)
Dept: OTHER | Facility: MEDICAL CENTER | Age: 59
End: 2019-04-25

## 2019-04-25 NOTE — PROGRESS NOTES
Telephone call to pt.  Stated 6th cycle of chemo went well.  Stated she just got back from a two mile walk and is feeling good.  Pt aware she is to have a follow up CT scan in 3 to four weeks post chemo.  She stating she is waiting to hear from her physician's office and if she doesn't hear from them by tomorrow she will call them.  Enquired about her distress rating of 3/10 yesterday and she stated it was just from being there.  No needs expressed at this time, encouraged to call with needs or questions.

## 2019-05-17 ENCOUNTER — HOSPITAL ENCOUNTER (OUTPATIENT)
Dept: RADIOLOGY | Facility: MEDICAL CENTER | Age: 59
End: 2019-05-17
Attending: SPECIALIST
Payer: COMMERCIAL

## 2019-05-17 DIAGNOSIS — C56.9 MALIGNANT NEOPLASM OF OVARY, UNSPECIFIED LATERALITY (HCC): ICD-10-CM

## 2019-05-17 PROCEDURE — 700117 HCHG RX CONTRAST REV CODE 255: Performed by: SPECIALIST

## 2019-05-17 PROCEDURE — 74177 CT ABD & PELVIS W/CONTRAST: CPT

## 2019-05-17 RX ADMIN — IOHEXOL 100 ML: 350 INJECTION, SOLUTION INTRAVENOUS at 10:56

## 2019-05-17 RX ADMIN — IOHEXOL 25 ML: 240 INJECTION, SOLUTION INTRATHECAL; INTRAVASCULAR; INTRAVENOUS; ORAL at 10:56

## 2019-05-22 DIAGNOSIS — I82.409 DEEP VEIN THROMBOSIS (DVT) OF LOWER EXTREMITY, UNSPECIFIED CHRONICITY, UNSPECIFIED LATERALITY, UNSPECIFIED VEIN (HCC): ICD-10-CM

## 2019-05-23 ENCOUNTER — PATIENT OUTREACH (OUTPATIENT)
Dept: OTHER | Facility: MEDICAL CENTER | Age: 59
End: 2019-05-23

## 2019-05-23 NOTE — PROGRESS NOTES
Telephone call to pt to discuss cancer survivorship care plan and treatment summary.  Reviewed SCP with pt, including surgery, pathology and chemotherapy received.  Pt stated she has no lasting side effects from her treatment.  Reviewed follow up schedule with Dr. Martinez.  Pt verbalized understanding.  Will mail packet to pt at her home address. She will call once she receives reviews it if she has questions.

## 2019-06-06 ENCOUNTER — HOSPITAL ENCOUNTER (OUTPATIENT)
Dept: RADIOLOGY | Facility: MEDICAL CENTER | Age: 59
End: 2019-06-06
Attending: NURSE PRACTITIONER
Payer: COMMERCIAL

## 2019-06-06 DIAGNOSIS — I82.409 DEEP VEIN THROMBOSIS (DVT) OF LOWER EXTREMITY, UNSPECIFIED CHRONICITY, UNSPECIFIED LATERALITY, UNSPECIFIED VEIN (HCC): ICD-10-CM

## 2019-06-06 ASSESSMENT — ENCOUNTER SYMPTOMS: SHORTNESS OF BREATH: 0

## 2019-06-06 ASSESSMENT — LIFESTYLE VARIABLES: SUBSTANCE_ABUSE: 0

## 2019-06-06 NOTE — CONSULTS
Interventional Radiology Follow Up     Re: Ani Parham     MRN: [id number]   : 1960    Ani Parham was self referred for questions about inferior vena cava filter retrieval. She is under the care of Pipe Martinez MD and Prabha Herzog MD and has been in communication with Meenakshi HUTCHINSON regarding filter removal.    History of Present Illness:   presented with pulmonary embolus and right heart strain from a DVT in 2018. She was found to have a large pelvic mass. An inferior vena cava filter was placed on  by Dallin Maradiaga MD in . She has recovered from her illnesses and has had a recent imaging follow up, showing no active disease. She is on warfarin managed by the [facility]. She is very active and at her baseline health. She has been in discussion with her surgical oncologist about filter retrieval, anticoagulation, and conversion to a permanent filter. She is accompanied by her  to the appointment.    Past Medical History:   Diagnosis Date   • Hypothyroidism      Past Surgical History:   Procedure Laterality Date   • LAPAROSCOPY ROBOTIC XI  10/29/2018    Procedure: Laparoscopy, Exploratory Laparotomy , Total abdominal Hysterectomy, Bilateral salpingo-oophorectomy, Left Ureterolysis, Resection of Left pelvic Sidewall and Left Madeleine Ureteral Tissue, Complete Omentectomy, left Madeleine aortic Renal Lymphadenectomy;  Surgeon: Pipe Martinez M.D.;  Location: SURGERY [facility];  Service: Gynecology   • PRIMARY C SECTION      x 2   • SINUSCOPY       Social History     Social History   • Marital status:      Spouse name: N/A   • Number of children: N/A   • Years of education: N/A     Occupational History   • Not on file.     Social History Main Topics   • Smoking status: Never Smoker   • Smokeless tobacco: Never Used   • Alcohol use No   • Drug use: No   • Sexual activity: Not on file     Other Topics Concern   • Not on file     Social History  Narrative    Lives with .     Has 2 children.     Work: stay at home mom         Family History   Problem Relation Age of Onset   • Hypertension Father    • Hyperlipidemia Father    • Blood Disease Father         pernicious anemia   • Psychiatry Mother         bipolar   • Stroke Maternal Grandfather    • Cancer Neg Hx    • Diabetes Neg Hx    • Heart Disease Neg Hx    • Thyroid Neg Hx        Review of Systems   Respiratory: Negative for shortness of breath.    Cardiovascular: Negative for leg swelling.        Small varicoid plexus superior to medial malleolus. No edema noted.    Psychiatric/Behavioral: Negative for substance abuse.     A comprehensive 14-point review of systems was negative except as described above.     Labs:      Ref. Range 2/20/2019 11:45   Sodium Latest Ref Range: 135 - 145 mmol/L 137   Potassium Latest Ref Range: 3.6 - 5.5 mmol/L 4.1   Chloride Latest Ref Range: 96 - 112 mmol/L 103   Co2 Latest Ref Range: 20 - 33 mmol/L 24   Anion Gap Latest Ref Range: 0.0 - 11.9  10.0   Glucose Latest Ref Range: 65 - 99 mg/dL 141 (H)   Bun Latest Ref Range: 8 - 22 mg/dL 26 (H)   Creatinine Latest Ref Range: 0.50 - 1.40 mg/dL 0.81   GFR If  Latest Ref Range: >60 mL/min/1.73 m 2 >60   GFR If Non  Latest Ref Range: >60 mL/min/1.73 m 2 >60   Calcium Latest Ref Range: 8.5 - 10.5 mg/dL 9.6   AST(SGOT) Latest Ref Range: 12 - 45 U/L 23   ALT(SGPT) Latest Ref Range: 2 - 50 U/L 21   Alkaline Phosphatase Latest Ref Range: 30 - 99 U/L 52   Total Bilirubin Latest Ref Range: 0.1 - 1.5 mg/dL 0.3   Albumin Latest Ref Range: 3.2 - 4.9 g/dL 4.4   Total Protein Latest Ref Range: 6.0 - 8.2 g/dL 7.2   Globulin Latest Ref Range: 1.9 - 3.5 g/dL 2.8   A-G Ratio Latest Units: g/dL 1.6       Pathology:  [facility] October 30, 2018:  FINAL DIAGNOSIS:    A. Left fallopian tube and ovary:         Well-differentiated, Endometrioid carcinoma of left ovary, 10.7          cm in breadth, with focal invasion  into portion of adherent          fat.         Left fallopian tube with chronic salpingitis and adhesions but          no evidence of atypia or malignancy.  B. Omentum:         Negative for malignancy.         Adhesions, reactive fibrosis, reactive change and chronic          inflammation with a few scattered neutrophils.  C. Sigmoid serosa:         Negative for malignancy.         Adhesions, reactive change, and sparse chronic inflammation.  D. Left perimetrium, uterus, right fallopian tube and ovary:         Extensive serosal adhesions with inflammation.         Cervix: Small, benign endocervical polyp.           Negative for chronic endometritis, hyperplasia and malignancy           Left perimetrial wing: Nodule of metastatic endometrioid          carcinoma, 8 mm in breadth, not present on surgical margin.         Endometrium: Atrophic endometrium.           Negative for chronic endometritis, hyperplasia and malignancy.         Myometrium: Histologically unremarkable.         Right fallopian tube: Negative for atypia and malignancy.           Hemosalpinx.         Right ovary: Negative for malignancy.           Serosal adhesions.    E. Abdominal pelvic fluid cytology:         Negative for malignant cells.  F. Left supra-inferior mesenteric artery node:         Three lymph nodes negative for metastatic carcinoma. (0/3)  G. Left infra-inferior mesenteric artery node:         One lymph node negative for metastatic carcinoma. (0/1)  H. Left ovarian vein:         Negative for malignancy.         Vein and surrounding fatty tissue.  I. Left pelvic node:         One lymph node negative for metastatic carcinoma. (0/1)  J. Left obturator:         Three lymph nodes negative for metastatic carcinoma. (0/3)    Radiology:   CT AP on May 17, 2019 at [facility]:  Diminished size of left para-aortic lymph nodes which are normal in size.  No findings suspicious for local recurrence or metastasis  Status posy oophorectomies,  hysterectomy and omentectomy  Stable hepatic too small to characterize hypodensities could represent cysts, hemangiomas or less benign processes. Continued attention in follow-up is advised  Inferior vena cava filter        Interventional radiology procedure October 27, 2018, at [facility]:  1. Ultrasound and fluoroscopic guided placement of a Cook Celect retrievable IVC filter.  2. Inferior vena cavagram within normal limits with no evidence of caval thrombus or occlusion.      Current Outpatient Prescriptions   Medication Sig Dispense Refill   • dexamethasone (DECADRON) 4 MG Tab Take 2 mg by mouth 2 times a day.     • ondansetron (ZOFRAN ODT) 4 MG TABLET DISPERSIBLE Take 4 mg by mouth every 6 hours as needed for Nausea.     • warfarin (COUMADIN) 5 MG Tab Take 1 Tab by mouth every day. 30 Tab 3   • acetaminophen (TYLENOL) 325 MG Tab Take 650 mg by mouth every 6 hours as needed for Mild Pain.     • loratadine (CLARITIN) 10 MG Tab Take 10 mg by mouth every day.     • multivitamin (THERAGRAN) Tab Take 1 Tab by mouth every day.     • FIBER PO Take 2 Tabs by mouth 4 times a day.     • cyanocobalamin (VITAMIN B-12) 1000 MCG/ML Solution 1,000 mcg by Intramuscular route every 30 days.     • levothyroxine (SYNTHROID) 25 MCG Tab Take 1 Tab by mouth Every morning on an empty stomach. 90 Tab 1     No current facility-administered medications for this encounter.        Allergies   Allergen Reactions   • Fluconazole Swelling     Face Swelling       Physical Exam   Constitutional: She is oriented to person, place, and time and well-developed, well-nourished, and in no distress. No distress.   HENT:   Head: Normocephalic.   Pulmonary/Chest: Effort normal. No respiratory distress.   Abdominal: Soft.   No ascites noted   Neurological: She is alert and oriented to person, place, and time. She has normal sensation and normal strength. She is not agitated and not disoriented. She displays no weakness, no tremor, facial symmetry, normal  stance and normal speech. Gait normal. Gait normal.   Skin: Skin is warm and dry. No rash noted. She is not diaphoretic. No erythema. No pallor.   Psychiatric: Mood, memory, affect and judgment normal.     ECOG Performance Status 0    Impression:   1. Inferior vena cava filter in place.  2. History of pulmonary embolus with right heart strain, resolved.  3. History of deep venous thrombosis, resolved.  4. Endometrial carcinoma.    Plan:   Dallin Maradiaga MD has reviewed 's history and imaging studies, examined the patient, and discussed treatment options. She and her  have done extensive research about IVC filters and the presence of this filter is causing anxiety and constant worry. She has concerns about anticoagulation versus leaving the filter in the short term or for a few more months. We explained the retrieval procedure and that while removing filters earlier is technically easier, it is still feasible to retrieve a filter that has been in place for a year or longer. We discussed leaving the filter in place with the small incidence of fragmentation in the future. We encouraged her to discuss her options with Dr. Martinez, but since she demonstrates significant anxiety over this filter being in place and therefore our recommendation is to remove it for her peace of mind. She had additional questions about warfarin versus a DOAC and we advised her to discuss with her anticoagulation provider. All questions were answered.     EVANGELINA Durant with Dallin Maradiaga MD  Interventional Radiology   [address] ([address])  [place] [address]  [phone]

## 2019-06-11 ENCOUNTER — TELEPHONE (OUTPATIENT)
Dept: VASCULAR LAB | Facility: MEDICAL CENTER | Age: 59
End: 2019-06-11

## 2019-06-11 DIAGNOSIS — Z95.828 PRESENCE OF IVC FILTER: ICD-10-CM

## 2019-06-11 NOTE — PROGRESS NOTES
Spoke with pt over phone regarding IVC filter removal.  She has discussed her case with Dr. Maradiaga and would like to proceed with retrieval.  Questions answered.  Order placed in system for scheduling.    Meenakshi HUTCHINSON  [facility]

## 2019-06-14 DIAGNOSIS — Z01.812 PRE-OPERATIVE LABORATORY EXAMINATION: ICD-10-CM

## 2019-06-14 LAB
CREAT SERPL-MCNC: 0.89 MG/DL (ref 0.5–1.4)
INR PPP: 0.93 (ref 0.87–1.13)
PLATELET # BLD AUTO: 193 K/UL (ref 164–446)
PROTHROMBIN TIME: 12.6 SEC (ref 12–14.6)

## 2019-06-14 PROCEDURE — 85610 PROTHROMBIN TIME: CPT

## 2019-06-14 PROCEDURE — 36415 COLL VENOUS BLD VENIPUNCTURE: CPT

## 2019-06-14 PROCEDURE — 82565 ASSAY OF CREATININE: CPT

## 2019-06-14 PROCEDURE — 85049 AUTOMATED PLATELET COUNT: CPT

## 2019-06-14 RX ORDER — LEVOTHYROXINE SODIUM 0.05 MG/1
50 TABLET ORAL
COMMUNITY
End: 2022-08-22

## 2019-06-18 ENCOUNTER — APPOINTMENT (OUTPATIENT)
Dept: RADIOLOGY | Facility: MEDICAL CENTER | Age: 59
End: 2019-06-18
Attending: NURSE PRACTITIONER
Payer: COMMERCIAL

## 2019-06-18 ENCOUNTER — HOSPITAL ENCOUNTER (OUTPATIENT)
Facility: MEDICAL CENTER | Age: 59
End: 2019-06-18
Attending: INTERNAL MEDICINE | Admitting: RADIOLOGY
Payer: COMMERCIAL

## 2019-06-18 VITALS
WEIGHT: 135.58 LBS | TEMPERATURE: 97.6 F | OXYGEN SATURATION: 96 % | HEIGHT: 65 IN | DIASTOLIC BLOOD PRESSURE: 74 MMHG | RESPIRATION RATE: 14 BRPM | HEART RATE: 75 BPM | BODY MASS INDEX: 22.59 KG/M2 | SYSTOLIC BLOOD PRESSURE: 124 MMHG

## 2019-06-18 DIAGNOSIS — Z95.828 PRESENCE OF IVC FILTER: ICD-10-CM

## 2019-06-18 PROCEDURE — 700105 HCHG RX REV CODE 258: Performed by: RADIOLOGY

## 2019-06-18 PROCEDURE — 160002 HCHG RECOVERY MINUTES (STAT)

## 2019-06-18 PROCEDURE — 700111 HCHG RX REV CODE 636 W/ 250 OVERRIDE (IP): Performed by: RADIOLOGY

## 2019-06-18 PROCEDURE — 99153 MOD SED SAME PHYS/QHP EA: CPT

## 2019-06-18 PROCEDURE — 700111 HCHG RX REV CODE 636 W/ 250 OVERRIDE (IP)

## 2019-06-18 PROCEDURE — 700117 HCHG RX CONTRAST REV CODE 255: Performed by: RADIOLOGY

## 2019-06-18 RX ORDER — SODIUM CHLORIDE 9 MG/ML
500 INJECTION, SOLUTION INTRAVENOUS
Status: DISCONTINUED | OUTPATIENT
Start: 2019-06-18 | End: 2019-06-18 | Stop reason: HOSPADM

## 2019-06-18 RX ORDER — ONDANSETRON 2 MG/ML
4 INJECTION INTRAMUSCULAR; INTRAVENOUS PRN
Status: DISCONTINUED | OUTPATIENT
Start: 2019-06-18 | End: 2019-06-18 | Stop reason: HOSPADM

## 2019-06-18 RX ORDER — MIDAZOLAM HYDROCHLORIDE 1 MG/ML
.5-2 INJECTION INTRAMUSCULAR; INTRAVENOUS PRN
Status: DISCONTINUED | OUTPATIENT
Start: 2019-06-18 | End: 2019-06-18 | Stop reason: HOSPADM

## 2019-06-18 RX ORDER — MIDAZOLAM HYDROCHLORIDE 1 MG/ML
INJECTION INTRAMUSCULAR; INTRAVENOUS
Status: COMPLETED
Start: 2019-06-18 | End: 2019-06-18

## 2019-06-18 RX ORDER — SODIUM CHLORIDE 9 MG/ML
1000 INJECTION, SOLUTION INTRAVENOUS
Status: DISCONTINUED | OUTPATIENT
Start: 2019-06-18 | End: 2019-06-18 | Stop reason: HOSPADM

## 2019-06-18 RX ADMIN — IOHEXOL 40 ML: 300 INJECTION, SOLUTION INTRAVENOUS at 15:30

## 2019-06-18 RX ADMIN — MIDAZOLAM 1 MG: 1 INJECTION INTRAMUSCULAR; INTRAVENOUS at 15:21

## 2019-06-18 RX ADMIN — FENTANYL CITRATE 25 MCG: 50 INJECTION, SOLUTION INTRAMUSCULAR; INTRAVENOUS at 15:26

## 2019-06-18 RX ADMIN — MIDAZOLAM 1 MG: 1 INJECTION INTRAMUSCULAR; INTRAVENOUS at 15:29

## 2019-06-18 RX ADMIN — SODIUM CHLORIDE 1000 ML: 9 INJECTION, SOLUTION INTRAVENOUS at 12:29

## 2019-06-18 RX ADMIN — MIDAZOLAM HYDROCHLORIDE 1 MG: 1 INJECTION, SOLUTION INTRAMUSCULAR; INTRAVENOUS at 15:21

## 2019-06-18 RX ADMIN — FENTANYL CITRATE 25 MCG: 50 INJECTION, SOLUTION INTRAMUSCULAR; INTRAVENOUS at 15:21

## 2019-06-18 RX ADMIN — FENTANYL CITRATE 25 MCG: 50 INJECTION, SOLUTION INTRAMUSCULAR; INTRAVENOUS at 15:29

## 2019-06-18 RX ADMIN — MIDAZOLAM 1 MG: 1 INJECTION INTRAMUSCULAR; INTRAVENOUS at 15:26

## 2019-06-18 RX ADMIN — FENTANYL CITRATE 25 MCG: 50 INJECTION, SOLUTION INTRAMUSCULAR; INTRAVENOUS at 15:38

## 2019-06-18 RX ADMIN — MIDAZOLAM 1 MG: 1 INJECTION INTRAMUSCULAR; INTRAVENOUS at 15:38

## 2019-06-18 RX ADMIN — FENTANYL CITRATE 25 MCG: 50 INJECTION INTRAMUSCULAR; INTRAVENOUS at 15:21

## 2019-06-18 NOTE — OR SURGEON
Immediate Post- Operative Note        PostOp Diagnosis: IVC filter no longer needed      Procedure(s): cavagram, IVC filter retrieval      Estimated Blood Loss: Less than 5 ml        Complications: None            6/18/2019     3:48 PM     Rafi Maradiaga

## 2019-06-18 NOTE — DISCHARGE INSTRUCTIONS
ACTIVITY: Rest and take it easy for the first 24 hours.  A responsible adult is recommended to remain with you during that time.  It is normal to feel sleepy.  We encourage you to not do anything that requires balance, judgment or coordination.    MILD FLU-LIKE SYMPTOMS ARE NORMAL. YOU MAY EXPERIENCE GENERALIZED MUSCLE ACHES, THROAT IRRITATION, HEADACHE AND/OR SOME NAUSEA.    FOR 24 HOURS DO NOT:  Drive, operate machinery or run household appliances.  Drink beer or alcoholic beverages.   Make important decisions or sign legal documents.    SPECIAL INSTRUCTIONS: follow up with primary care physician as needed  If you experience chest pain shortness of breath call 911 return to ER   Resume your home medications     DIET: To avoid nausea, slowly advance diet as tolerated, avoiding spicy or greasy foods for the first day.  Add more substantial food to your diet according to your physician's instructions.  Babies can be fed formula or breast milk as soon as they are hungry.  INCREASE FLUIDS AND FIBER TO AVOID CONSTIPATION.    SURGICAL DRESSING/BATHING: keep dressing clean dry intact for 48 hours, remove dressing after 48 hours.      FOLLOW-UP APPOINTMENT:  A follow-up appointment should be arranged with your doctor in [phone]; call to schedule.    You should CALL YOUR PHYSICIAN if you develop:  Fever greater than 101 degrees F.  Pain not relieved by medication, or persistent nausea or vomiting.  Excessive bleeding (blood soaking through dressing) or unexpected drainage from the wound.  Extreme redness or swelling around the incision site, drainage of pus or foul smelling drainage.  Inability to urinate or empty your bladder within 8 hours.  Problems with breathing or chest pain.    You should call 911 if you develop problems with breathing or chest pain.  If you are unable to contact your doctor or surgical center, you should go to the nearest emergency room or urgent care center.  Physician's telephone #:  [phone]    If any questions arise, call your doctor.  If your doctor is not available, please feel free to call the Surgical Center at [phone].  The Center is open Monday through Friday from 7AM to 7PM.  You can also call the HEALTH HOTLINE open 24 hours/day, 7 days/week and speak to a nurse at [phone], or toll free at [phone].    A registered nurse may call you a few days after your surgery to see how you are doing after your procedure.    MEDICATIONS: Resume taking daily medication.  Take prescribed pain medication with food.  If no medication is prescribed, you may take non-aspirin pain medication if needed.  PAIN MEDICATION CAN BE VERY CONSTIPATING.  Take a stool softener or laxative such as senokot, pericolace, or milk of magnesia if needed.      If your physician has prescribed pain medication that includes Acetaminophen (Tylenol), do not take additional Acetaminophen (Tylenol) while taking the prescribed medication.    Depression / Suicide Risk    As you are discharged from this [facility] facility, it is important to learn how to keep safe from harming yourself.    Recognize the warning signs:  · Abrupt changes in personality, positive or negative- including increase in energy   · Giving away possessions  · Change in eating patterns- significant weight changes-  positive or negative  · Change in sleeping patterns- unable to sleep or sleeping all the time   · Unwillingness or inability to communicate  · Depression  · Unusual sadness, discouragement and loneliness  · Talk of wanting to die  · Neglect of personal appearance   · Rebelliousness- reckless behavior  · Withdrawal from people/activities they love  · Confusion- inability to concentrate     If you or a loved one observes any of these behaviors or has concerns about self-harm, here's what you can do:  · Talk about it- your feelings and reasons for harming yourself  · Remove any means that you might use to hurt yourself (examples:  pills, rope, extension cords, firearm)  · Get professional help from the community (Mental Health, Substance Abuse, psychological counseling)  · Do not be alone:Call your Safe Contact- someone whom you trust who will be there for you.  · Call your local CRISIS HOTLINE 202-2546 or 743-051-0748  · Call your local Children's Mobile Crisis Response Team [place] (496) 965-7036 or www.TradeCloud.nl  · Call the toll free National Suicide Prevention Hotlines   · National Suicide Prevention Lifeline 387-701-RYEX (9220)  · National Hope Line Network 800-SUICIDE (211-3193)

## 2019-06-18 NOTE — PROGRESS NOTES
Procedure Confirmed with MD, RN, RT and patient pre procedure.  Inferior Vena Cava Filter Removal by MD Maradiaga assisted by RT Peck, Right Internal Jugular Vein access site.    IVC Filter removal successful, with no complication.    End Tidal CO2 range 26-33 throughout procedure.    Right Internal Jugular Vein access site, covered with gauze/tegaderm, C/D/I.    Patient tolerated procedure, hemodynamically stable; pt drowsy but talking post procedure; report given to EMILY Calloway.  Patient transported to PACU, pod 8a via IR RN monitored then transferred care to report RN.

## 2019-06-18 NOTE — OR NURSING
1607 patient arrived from IR s/p IVC filter removal patient awake, denies pain, right neck access site dressing clean dry intact. Vss.   1629 discharge instructions given to patient. Patient verbalize understanding of the orders. Reviewed dressing care, medications, activity.   1635 patient escorted via walking with all her personal belongings.

## 2019-06-19 ENCOUNTER — TELEPHONE (OUTPATIENT)
Dept: VASCULAR LAB | Facility: MEDICAL CENTER | Age: 59
End: 2019-06-19

## 2019-08-30 ENCOUNTER — HOSPITAL ENCOUNTER (OUTPATIENT)
Dept: RADIOLOGY | Facility: MEDICAL CENTER | Age: 59
End: 2019-08-30

## 2019-09-23 ENCOUNTER — HOSPITAL ENCOUNTER (OUTPATIENT)
Dept: RADIOLOGY | Facility: MEDICAL CENTER | Age: 59
End: 2019-09-23
Attending: FAMILY MEDICINE
Payer: COMMERCIAL

## 2019-09-23 DIAGNOSIS — Z12.31 VISIT FOR SCREENING MAMMOGRAM: ICD-10-CM

## 2019-09-23 PROCEDURE — 77063 BREAST TOMOSYNTHESIS BI: CPT

## 2019-10-01 ENCOUNTER — HOSPITAL ENCOUNTER (OUTPATIENT)
Dept: RADIOLOGY | Facility: MEDICAL CENTER | Age: 59
End: 2019-10-01
Attending: FAMILY MEDICINE
Payer: COMMERCIAL

## 2019-10-01 ENCOUNTER — HOSPITAL ENCOUNTER (OUTPATIENT)
Dept: RADIOLOGY | Facility: MEDICAL CENTER | Age: 59
End: 2019-10-01

## 2019-10-01 DIAGNOSIS — G95.0 SYRINX OF SPINAL CORD (HCC): ICD-10-CM

## 2019-10-01 PROCEDURE — 72146 MRI CHEST SPINE W/O DYE: CPT

## 2019-10-01 PROCEDURE — 72148 MRI LUMBAR SPINE W/O DYE: CPT

## 2019-11-20 ENCOUNTER — HOSPITAL ENCOUNTER (OUTPATIENT)
Dept: RADIOLOGY | Facility: MEDICAL CENTER | Age: 59
End: 2019-11-20
Attending: NURSE PRACTITIONER
Payer: COMMERCIAL

## 2019-11-20 DIAGNOSIS — C56.9 MALIGNANT NEOPLASM OF OVARY, UNSPECIFIED LATERALITY (HCC): ICD-10-CM

## 2019-11-20 PROCEDURE — 700117 HCHG RX CONTRAST REV CODE 255: Performed by: NURSE PRACTITIONER

## 2019-11-20 PROCEDURE — 71260 CT THORAX DX C+: CPT

## 2019-11-20 RX ADMIN — IOHEXOL 100 ML: 350 INJECTION, SOLUTION INTRAVENOUS at 10:08

## 2019-11-20 RX ADMIN — IOHEXOL 25 ML: 240 INJECTION, SOLUTION INTRATHECAL; INTRAVASCULAR; INTRAVENOUS; ORAL at 10:05

## 2020-10-20 ENCOUNTER — APPOINTMENT (RX ONLY)
Dept: URBAN - METROPOLITAN AREA CLINIC 4 | Facility: CLINIC | Age: 60
Setting detail: DERMATOLOGY
End: 2020-10-20

## 2020-10-20 DIAGNOSIS — L81.4 OTHER MELANIN HYPERPIGMENTATION: ICD-10-CM

## 2020-10-20 DIAGNOSIS — D22 MELANOCYTIC NEVI: ICD-10-CM

## 2020-10-20 DIAGNOSIS — L82.1 OTHER SEBORRHEIC KERATOSIS: ICD-10-CM

## 2020-10-20 PROBLEM — D48.5 NEOPLASM OF UNCERTAIN BEHAVIOR OF SKIN: Status: ACTIVE | Noted: 2020-10-20

## 2020-10-20 PROBLEM — D22.5 MELANOCYTIC NEVI OF TRUNK: Status: ACTIVE | Noted: 2020-10-20

## 2020-10-20 PROCEDURE — ? BIOPSY BY SHAVE METHOD

## 2020-10-20 PROCEDURE — ? OBSERVATION

## 2020-10-20 PROCEDURE — 99203 OFFICE O/P NEW LOW 30 MIN: CPT | Mod: 25

## 2020-10-20 PROCEDURE — ? PRESCRIPTION

## 2020-10-20 PROCEDURE — 11102 TANGNTL BX SKIN SINGLE LES: CPT

## 2020-10-20 RX ORDER — TRETIONIN 1 MG/G
CREAM TOPICAL
Qty: 1 | Refills: 3 | Status: ERX | COMMUNITY
Start: 2020-10-20

## 2020-10-20 RX ADMIN — TRETIONIN: 1 CREAM TOPICAL at 00:00

## 2020-10-20 ASSESSMENT — LOCATION ZONE DERM
LOCATION ZONE: TRUNK
LOCATION ZONE: FACE

## 2020-10-20 ASSESSMENT — LOCATION DETAILED DESCRIPTION DERM
LOCATION DETAILED: LEFT LATERAL ABDOMEN
LOCATION DETAILED: EPIGASTRIC SKIN
LOCATION DETAILED: LEFT SUPERIOR UPPER BACK
LOCATION DETAILED: LEFT INFERIOR CENTRAL MALAR CHEEK

## 2020-10-20 ASSESSMENT — LOCATION SIMPLE DESCRIPTION DERM
LOCATION SIMPLE: LEFT UPPER BACK
LOCATION SIMPLE: LEFT CHEEK
LOCATION SIMPLE: ABDOMEN

## 2021-03-15 DIAGNOSIS — Z23 NEED FOR VACCINATION: ICD-10-CM

## 2021-04-07 ENCOUNTER — IMMUNIZATION (OUTPATIENT)
Dept: FAMILY PLANNING/WOMEN'S HEALTH CLINIC | Facility: IMMUNIZATION CENTER | Age: 61
End: 2021-04-07
Attending: INTERNAL MEDICINE
Payer: COMMERCIAL

## 2021-04-07 DIAGNOSIS — Z23 ENCOUNTER FOR VACCINATION: Primary | ICD-10-CM

## 2021-04-07 DIAGNOSIS — Z23 NEED FOR VACCINATION: ICD-10-CM

## 2021-04-07 PROCEDURE — 0001A PFIZER SARS-COV-2 VACCINE: CPT

## 2021-04-07 PROCEDURE — 91300 PFIZER SARS-COV-2 VACCINE: CPT

## 2021-04-29 ENCOUNTER — IMMUNIZATION (OUTPATIENT)
Dept: FAMILY PLANNING/WOMEN'S HEALTH CLINIC | Facility: IMMUNIZATION CENTER | Age: 61
End: 2021-04-29
Payer: COMMERCIAL

## 2021-04-29 DIAGNOSIS — Z23 ENCOUNTER FOR VACCINATION: Primary | ICD-10-CM

## 2021-04-29 PROCEDURE — 0002A PFIZER SARS-COV-2 VACCINE: CPT

## 2021-04-29 PROCEDURE — 91300 PFIZER SARS-COV-2 VACCINE: CPT

## 2021-11-04 ENCOUNTER — TELEPHONE (OUTPATIENT)
Dept: MEDICAL GROUP | Facility: CLINIC | Age: 61
End: 2021-11-04

## 2021-11-04 NOTE — TELEPHONE ENCOUNTER
VOICEMAIL  1. Caller Name: Ani Parham                      Call Back Number:[phone] (home) [phone] (work)     2. Message: Patient Left a voice mail regarding Abnormal labs:  Patient state she has a visit with the Oncology  and her  CA25 is elevate so they going to do  Another Ct scans . Pt needs a call back for  Dr. Herzog to discuss.

## 2021-11-05 NOTE — TELEPHONE ENCOUNTER
I called the patient back.  Her CA-125 is now 28.  This is still in the normal range, but more than it has been in the past.  Dr. Martinez is increasing her surveillance imaging.    She has had diarrhea one day every 2 weeks for 3 episodes.  [place] [facility] [place]  No fever. No weight loss.  No blood in stool.  Last colonoscopy 9 years ago, normal.  Next colonoscopy due next year.  No vomiting. No abdominal pain.  No ill contacts.  No h/o IBS.    She will watch the diarrhea.  If it continues for an additional week, stool studies will be ordered.

## 2022-02-01 ENCOUNTER — PATIENT MESSAGE (OUTPATIENT)
Dept: MEDICAL GROUP | Facility: CLINIC | Age: 62
End: 2022-02-01

## 2022-02-01 ENCOUNTER — TELEPHONE (OUTPATIENT)
Dept: MEDICAL GROUP | Facility: CLINIC | Age: 62
End: 2022-02-01

## 2022-02-01 DIAGNOSIS — C56.9 MALIGNANT NEOPLASM OF OVARY, UNSPECIFIED LATERALITY (HCC): ICD-10-CM

## 2022-02-01 DIAGNOSIS — K62.89 RECTAL MASS: ICD-10-CM

## 2022-02-01 NOTE — TELEPHONE ENCOUNTER
VOICEMAIL  1. Caller Name: Ani Parham                     Call Back Number: [phone] (home) [phone] (work)    2. Message:   Patient state Dr Martinez Order a Ct Scans at [facility] , Ct Show a rectal mass , and he recommend a Colonoscopy referral. Patient has a follow up Dr Martinez on 01/02/2022.  Forward to Dr. Herzog for decision.    3. Patient approves office to leave a detailed voicemail/MyChart message: yes

## 2022-02-08 ENCOUNTER — TELEPHONE (OUTPATIENT)
Dept: MEDICAL GROUP | Facility: CLINIC | Age: 62
End: 2022-02-08

## 2022-02-08 DIAGNOSIS — C56.9 MALIGNANT NEOPLASM OF OVARY, UNSPECIFIED LATERALITY (HCC): ICD-10-CM

## 2022-02-09 NOTE — TELEPHONE ENCOUNTER
"Jerri from Dr. Martinez\"s office  ([phone] )call requesting s referral for Patient Ani Parham , Dr. Martinez office advise pt to reach out PCP to Order the referral.   This referral needs to be approved for [facility] H[employer] insurance  Forward to Dr. Herzog for decision.   "

## 2022-05-09 RX ORDER — LEVOTHYROXINE SODIUM 0.05 MG/1
TABLET ORAL
COMMUNITY
Start: 2018-07-18 | End: 2022-08-22

## 2022-05-09 RX ORDER — BUDESONIDE 0.5 MG/2ML
INHALANT ORAL
COMMUNITY
Start: 2019-01-17 | End: 2022-05-09 | Stop reason: SDUPTHER

## 2022-05-09 RX ORDER — ACYCLOVIR 800 MG/1
TABLET ORAL
COMMUNITY
Start: 2018-03-05

## 2022-05-09 RX ORDER — ONDANSETRON 4 MG/1
4 TABLET, FILM COATED ORAL
COMMUNITY

## 2022-05-09 RX ORDER — AZELASTINE HYDROCHLORIDE 0.5 MG/ML
SOLUTION/ DROPS OPHTHALMIC
COMMUNITY
Start: 2018-05-31 | End: 2022-08-09 | Stop reason: SDUPTHER

## 2022-05-09 RX ORDER — DEXAMETHASONE 4 MG/1
4 TABLET ORAL
COMMUNITY

## 2022-05-09 RX ORDER — PREDNISONE 50 MG/1
TABLET ORAL
COMMUNITY
Start: 2018-09-17

## 2022-05-09 RX ORDER — AMITRIPTYLINE HYDROCHLORIDE 10 MG/1
TABLET, FILM COATED ORAL
COMMUNITY
Start: 2019-01-17

## 2022-05-09 RX ORDER — WARFARIN SODIUM 2.5 MG/1
TABLET ORAL
COMMUNITY
Start: 2018-11-08

## 2022-05-09 RX ORDER — TRAMADOL HYDROCHLORIDE 50 MG/1
TABLET ORAL
COMMUNITY
Start: 2018-11-08

## 2022-05-09 RX ORDER — IBUPROFEN 200 MG
TABLET ORAL
COMMUNITY
Start: 2018-03-05

## 2022-05-10 RX ORDER — BUDESONIDE 0.5 MG/2ML
INHALANT ORAL
Qty: 2 ML | Refills: 12 | Status: SHIPPED | OUTPATIENT
Start: 2022-05-10

## 2022-06-16 ENCOUNTER — TELEPHONE (OUTPATIENT)
Dept: MEDICAL GROUP | Facility: CLINIC | Age: 62
End: 2022-06-16
Payer: COMMERCIAL

## 2022-06-16 DIAGNOSIS — K94.13 ILEOSTOMY DYSFUNCTION (HCC): ICD-10-CM

## 2022-06-16 NOTE — TELEPHONE ENCOUNTER
VOICEMAIL  1. Caller Name: Ani Parham                      Call Back Number: [phone] (home) [phone] (work)    2. Message:   Pt request referral to be send to [facility]  To Dr. Gita eRnee .  Regarding  Problems with Zully ostomy    3. Patient approves office to leave a detailed voicemail/MyChart message: no

## 2022-08-09 RX ORDER — AZELASTINE HYDROCHLORIDE 0.5 MG/ML
SOLUTION/ DROPS OPHTHALMIC
Qty: 6 ML | Refills: 1 | Status: SHIPPED | OUTPATIENT
Start: 2022-08-09

## 2022-08-09 NOTE — TELEPHONE ENCOUNTER
Received request via: Pharmacy    Was the patient seen in the last year in this department? No , 09/15/2021    Does the patient have an active prescription (recently filled or refills available) for medication(s) requested? No

## 2022-08-23 RX ORDER — LEVOTHYROXINE SODIUM 0.05 MG/1
50 TABLET ORAL
Qty: 90 TABLET | Refills: 3 | Status: SHIPPED | OUTPATIENT
Start: 2022-08-23

## 2023-03-13 NOTE — PROGRESS NOTES
Hospital Medicine Daily Progress Note    Date of Service  10/31/2018    Chief Complaint  58 y.o. female admitted 10/24/2018 with history of hypothyroidism, chronic back pain, , epidural injection to L5-S1 2 weeks ago, who presented 10/24/2018 with chronic lower abdominal pain.  Follow-up MRI after the epidural injection demonstrated left 7.7 cm ovarian cyst.  Patient abdominal pain started about in 2018 and has been getting progressively worse it is sharp and dull, constant, worsening with movements,.  This morning pain is gotten acutely worse.  Patient was taken multiple doses of pain medication prescribed for back pain.  Ultrasound in the emergency department shows complex 11 x 11 x 7 cm heterogenous mass in the pelvis with small amount of free fluid.  Ultrasound of lower extremities showed DVT and patient was started on heparin.    Hospital Course    S/p IVC FILTER PLACEMENT (10/27)  S/p Laparoscopy with exploratory laparotomy with ERNST-BSO with resection of left parametrial   tissue and left ureterolysis      Interval Problem Update  Patient notes abdominal pain is improving. Requesting for diet to be advanced to full liquids. No acute episodes overnight per nursing staff    Consultants/Specialty  GYN    Code Status  FULL    Disposition  PENDING    Review of Systems  Review of Systems   Constitutional: Negative.    HENT: Negative.    Eyes: Negative.    Respiratory: Negative.    Cardiovascular: Negative.    Gastrointestinal: Positive for abdominal pain.   Musculoskeletal: Negative.    Neurological: Negative.    All other systems reviewed and are negative.       Physical Exam  Temp:  [36.8 °C (98.3 °F)-37.2 °C (99 °F)] 37.2 °C (99 °F)  Pulse:  [72-85] 82  Resp:  [16-18] 16  BP: ()/(47-65) 92/55    Physical Exam   Constitutional: She is oriented to person, place, and time. No distress.   HENT:   Head: Normocephalic and atraumatic.   Eyes: Pupils are equal, round, and reactive to light.   Neck: Normal  range of motion. Neck supple.   Cardiovascular: Normal rate, regular rhythm and normal heart sounds.    Pulmonary/Chest: Effort normal and breath sounds normal.   Abdominal: Soft. Bowel sounds are normal.   Musculoskeletal: Normal range of motion.   Neurological: She is alert and oriented to person, place, and time.   Skin: She is not diaphoretic.   Vitals reviewed.      Fluids    Intake/Output Summary (Last 24 hours) at 10/31/18 0806  Last data filed at 10/31/18 0600   Gross per 24 hour   Intake           1748.6 ml   Output             2530 ml   Net           -781.4 ml       Laboratory  Recent Labs      10/29/18   0439  10/30/18   0557  10/31/18   0017   WBC  4.9  5.3  5.4   RBC  3.14*  2.73*  2.57*   HEMOGLOBIN  10.3*  8.8*  8.1*   HEMATOCRIT  29.8*  26.2*  24.5*   MCV  94.9  96.0  95.3   MCH  32.8  32.2  31.5   MCHC  34.6  33.6  33.1*   RDW  39.6  40.3  39.7   PLATELETCT  249  183  181   MPV  9.5  9.9  9.8     Recent Labs      10/29/18   0439  10/30/18   0557  10/31/18   0017   SODIUM  137  134*  129*   POTASSIUM  3.8  4.4  4.8   CHLORIDE  101  104  101   CO2  27  21  23   GLUCOSE  114*  114*  128*   BUN  9  8  6*   CREATININE  0.52  0.47*  0.56   CALCIUM  9.0  8.0*  8.0*     Recent Labs      10/28/18   1629  10/28/18   2247  10/29/18   0439   APTT  52.4*  71.6*  79.6*     Recent Labs      10/28/18   1629   BNPBTYPENAT  49             Assessment/Plan  Pelvic mass   Assessment & Plan    - s/p  Laparoscopy...  Exploratory laparotomy with ERNST-BSO with resection of left parametrial tissue and left ureterolysis  - Ultrasound showed:1.  Large left adnexal mass is suspicious for neoplasm. Coexisting ovarian torsion cannot be excluded in the appropriate clinical setting. MRI of the pelvis using ovarian mass protocol may be helpful for further evaluation, if clinically indicated.  2.  Nonvisualization of the right ovary  3.  Small amount of free pelvic fluid.  - CT of the abd and pelvis showed:1. A 10.6 cm cystic and  solid left adnexal mass, highly concerning for malignancy.  2. Small volume ascites.  3. Tiny hypodensity in the right hepatic lobe, too small to characterize, likely simple cyst.  - Continue pain meds PRN with Toradol, Dilaudid and Percocet  - Pathology PENDING        DVT (deep venous thrombosis) (HCC)   Assessment & Plan    - S/p ivc filter placement (10/27)  - Continue Lovenox BID        Hypothyroidism- (present on admission)   Assessment & Plan    Continue home dose of levothyroxine.  TSH in AM  Follow-up with PCP             VTE prophylaxis: Lovenox       Burow's Advancement Flap Text: The defect edges were debeveled with a #15 scalpel blade.  Given the location of the defect and the proximity to free margins a Burow's advancement flap was deemed most appropriate.  Using a sterile surgical marker, the appropriate advancement flap was drawn incorporating the defect and placing the expected incisions within the relaxed skin tension lines where possible.    The area thus outlined was incised deep to adipose tissue with a #15 scalpel blade.  The skin margins were undermined to an appropriate distance in all directions utilizing iris scissors.

## (undated) DEVICE — SUTURE 0 VICRYL PLUS CT-2 - 27 INCH (36/BX)

## (undated) DEVICE — SUTURE 3-0 VICRYL PLUS SH - 27 INCH (36/BX)

## (undated) DEVICE — PAD OR TABLE DA VINCI 2IN X 20IN X 72IN - (12EA/CA)

## (undated) DEVICE — HEAD HOLDER JUNIOR/ADULT

## (undated) DEVICE — PENCIL ELECTSURG 10FT BTN SWH - (50/CA)

## (undated) DEVICE — SPONGE DRAIN 4 X 4IN 6-PLY - (2/PK25PK/BX12BX/CS)

## (undated) DEVICE — GLOVE BIOGEL PI ORTHO SZ 7 PF LF (40PR/BX)

## (undated) DEVICE — GLOVE SZ 7 BIOGEL PI MICRO - PF LF (50PR/BX 4BX/CA)

## (undated) DEVICE — SUTURE 2-0 VICRYL PLUS TP-1 - (24/BX)

## (undated) DEVICE — CLIP LG INTNL HRZN TI ESCP LGT - (20/BX)

## (undated) DEVICE — CATHETER IV 18 GA X 1-1/4 - SAFETY BRAUN (50/BX)

## (undated) DEVICE — SUTURE 3-0 MONOCRYL PLUS PS-1 - 27 INCH (36/BX)

## (undated) DEVICE — LIGASURE TISSUE FUSION  - SINGLE USE (6/CA)

## (undated) DEVICE — CHLORAPREP 26 ML APPLICATOR - ORANGE TINT(25/CA)

## (undated) DEVICE — SET LEADWIRE 5 LEAD BEDSIDE DISPOSABLE ECG (1SET OF 5/EA)

## (undated) DEVICE — BAG, SPONGE COUNT 50600

## (undated) DEVICE — CLIP MED INTNL HRZN TI ESCP - (25/BX)

## (undated) DEVICE — KIT ANESTHESIA W/CIRCUIT & 3/LT BAG W/FILTER (20EA/CA)

## (undated) DEVICE — SUTURE 0 COATED VICRYL 6-18IN - (12PK/BX)

## (undated) DEVICE — CLIP SM INTNL HRZN TI ESCP LGT - (24EA/PK 25PK/BX)

## (undated) DEVICE — DRESSING LEUKOMED STERILE 11.75X4IN - (50/CA)

## (undated) DEVICE — CANISTER SUCTION RIGID RED 1500CC (40EA/CA)

## (undated) DEVICE — CANISTER SUCTION 3000ML MECHANICAL FILTER AUTO SHUTOFF MEDI-VAC NONSTERILE LF DISP  (40EA/CA)

## (undated) DEVICE — TUBE E-T HI-LO CUFF 7.0MM (10EA/PK)

## (undated) DEVICE — SET EXTENSION WITH 2 PORTS (48EA/CA) ***PART #2C8610 IS A SUBSTITUTE*****

## (undated) DEVICE — BOVIE  BLADE 6 EXTENDED - (50/PK)

## (undated) DEVICE — TRANSDUCER ADULT DISP. SINGLE BONDED STERILE - (20EA/CA)

## (undated) DEVICE — TOWELS CLOTH SURGICAL - (4/PK 20PK/CA)

## (undated) DEVICE — NEPTUNE 4 PORT MANIFOLD - (20/PK)

## (undated) DEVICE — TUBE CONNECT SUCTION CLEAR 120 X 1/4" (50EA/CA)"

## (undated) DEVICE — SUTURE 2-0 ETHILON FS - (36/BX) 18 INCH

## (undated) DEVICE — TRAY CATHETER FOLEY URINE METER W/STATLOCK 350ML (10EA/CA)

## (undated) DEVICE — GLOVE BIOGEL PI INDICATOR SZ 7.5 SURGICAL PF LF -(50/BX 4BX/CA)

## (undated) DEVICE — BLADE SURGICAL #10 - (50/BX)

## (undated) DEVICE — OBTURATOR 8MM BLADELESS - (24EA/BX) DA VINCI

## (undated) DEVICE — WATER IRRIGATION STERILE 1000ML (12EA/CA)

## (undated) DEVICE — ELECTRODE DUAL RETURN W/ CORD - (50/PK)

## (undated) DEVICE — DRAIN J-VAC 10MM FLAT - (10/CA)

## (undated) DEVICE — DRAPE 4 ARM DA VANCI SI - (5EA/CA)

## (undated) DEVICE — LACTATED RINGERS INJ 1000 ML - (14EA/CA 60CA/PF)

## (undated) DEVICE — SUTURE GENERAL

## (undated) DEVICE — KIT ROOM DECONTAMINATION

## (undated) DEVICE — GLOVE BIOGEL SZ 6 PF LATEX - (50EA/BX 4BX/CA)

## (undated) DEVICE — SODIUM CHL IRRIGATION 0.9% 1000ML (12EA/CA)

## (undated) DEVICE — MASK ANESTHESIA ADULT  - (100/CA)

## (undated) DEVICE — SUCTION INSTRUMENT YANKAUER OPEN TIP W/O VENT (50EA/CA)

## (undated) DEVICE — RESERVOIR SUCTION 100 CC - SILICONE (20EA/CA)

## (undated) DEVICE — PAD LAP STERILE 18 X 18 - (5/PK 40PK/CA)